# Patient Record
Sex: FEMALE | Race: WHITE | NOT HISPANIC OR LATINO | Employment: UNEMPLOYED | URBAN - METROPOLITAN AREA
[De-identification: names, ages, dates, MRNs, and addresses within clinical notes are randomized per-mention and may not be internally consistent; named-entity substitution may affect disease eponyms.]

---

## 2021-10-16 RX ORDER — SPIRONOLACTONE 100 MG/1
TABLET, FILM COATED ORAL
COMMUNITY
End: 2021-10-20

## 2021-10-16 RX ORDER — LEVOTHYROXINE SODIUM 100 UG/1
TABLET ORAL
COMMUNITY
End: 2021-10-20

## 2021-10-16 RX ORDER — LEVOTHYROXINE SODIUM 112 UG/1
TABLET ORAL
COMMUNITY
End: 2021-10-20

## 2021-10-16 RX ORDER — CLOBETASOL PROPIONATE 0.5 MG/G
CREAM TOPICAL
COMMUNITY
End: 2022-11-02 | Stop reason: SDUPTHER

## 2021-10-16 RX ORDER — BUPROPION HYDROCHLORIDE 300 MG/1
TABLET ORAL
COMMUNITY
End: 2021-10-20

## 2021-12-15 ENCOUNTER — OFFICE VISIT (OUTPATIENT)
Dept: GASTROENTEROLOGY | Facility: CLINIC | Age: 46
End: 2021-12-15
Payer: COMMERCIAL

## 2021-12-15 VITALS
WEIGHT: 177 LBS | HEIGHT: 66 IN | SYSTOLIC BLOOD PRESSURE: 118 MMHG | BODY MASS INDEX: 28.45 KG/M2 | HEART RATE: 91 BPM | DIASTOLIC BLOOD PRESSURE: 78 MMHG

## 2021-12-15 DIAGNOSIS — K62.5 ANAL BLEEDING: ICD-10-CM

## 2021-12-15 DIAGNOSIS — R14.0 BLOATING: ICD-10-CM

## 2021-12-15 DIAGNOSIS — K58.2 IRRITABLE BOWEL SYNDROME WITH BOTH CONSTIPATION AND DIARRHEA: Primary | ICD-10-CM

## 2021-12-15 PROCEDURE — 99203 PR OFFICE/OUTPT VISIT, NEW, LEVL III, 30-44 MIN: ICD-10-PCS | Mod: S$GLB,,, | Performed by: INTERNAL MEDICINE

## 2021-12-15 PROCEDURE — 99999 PR PBB SHADOW E&M-EST. PATIENT-LVL V: CPT | Mod: PBBFAC,,, | Performed by: INTERNAL MEDICINE

## 2021-12-15 PROCEDURE — 1160F PR REVIEW ALL MEDS BY PRESCRIBER/CLIN PHARMACIST DOCUMENTED: ICD-10-PCS | Mod: CPTII,S$GLB,, | Performed by: INTERNAL MEDICINE

## 2021-12-15 PROCEDURE — 3074F PR MOST RECENT SYSTOLIC BLOOD PRESSURE < 130 MM HG: ICD-10-PCS | Mod: CPTII,S$GLB,, | Performed by: INTERNAL MEDICINE

## 2021-12-15 PROCEDURE — 1159F PR MEDICATION LIST DOCUMENTED IN MEDICAL RECORD: ICD-10-PCS | Mod: CPTII,S$GLB,, | Performed by: INTERNAL MEDICINE

## 2021-12-15 PROCEDURE — 3008F PR BODY MASS INDEX (BMI) DOCUMENTED: ICD-10-PCS | Mod: CPTII,S$GLB,, | Performed by: INTERNAL MEDICINE

## 2021-12-15 PROCEDURE — 1160F RVW MEDS BY RX/DR IN RCRD: CPT | Mod: CPTII,S$GLB,, | Performed by: INTERNAL MEDICINE

## 2021-12-15 PROCEDURE — 99203 OFFICE O/P NEW LOW 30 MIN: CPT | Mod: S$GLB,,, | Performed by: INTERNAL MEDICINE

## 2021-12-15 PROCEDURE — 3008F BODY MASS INDEX DOCD: CPT | Mod: CPTII,S$GLB,, | Performed by: INTERNAL MEDICINE

## 2021-12-15 PROCEDURE — 3078F DIAST BP <80 MM HG: CPT | Mod: CPTII,S$GLB,, | Performed by: INTERNAL MEDICINE

## 2021-12-15 PROCEDURE — 1159F MED LIST DOCD IN RCRD: CPT | Mod: CPTII,S$GLB,, | Performed by: INTERNAL MEDICINE

## 2021-12-15 PROCEDURE — 3074F SYST BP LT 130 MM HG: CPT | Mod: CPTII,S$GLB,, | Performed by: INTERNAL MEDICINE

## 2021-12-15 PROCEDURE — 3078F PR MOST RECENT DIASTOLIC BLOOD PRESSURE < 80 MM HG: ICD-10-PCS | Mod: CPTII,S$GLB,, | Performed by: INTERNAL MEDICINE

## 2021-12-15 PROCEDURE — 99999 PR PBB SHADOW E&M-EST. PATIENT-LVL V: ICD-10-PCS | Mod: PBBFAC,,, | Performed by: INTERNAL MEDICINE

## 2021-12-15 RX ORDER — SODIUM, POTASSIUM,MAG SULFATES 17.5-3.13G
1 SOLUTION, RECONSTITUTED, ORAL ORAL DAILY
Qty: 1 KIT | Refills: 0 | Status: SHIPPED | OUTPATIENT
Start: 2021-12-15 | End: 2021-12-17

## 2021-12-16 ENCOUNTER — PATIENT MESSAGE (OUTPATIENT)
Dept: ENDOSCOPY | Facility: HOSPITAL | Age: 46
End: 2021-12-16
Payer: COMMERCIAL

## 2022-01-27 ENCOUNTER — TELEPHONE (OUTPATIENT)
Dept: GASTROENTEROLOGY | Facility: CLINIC | Age: 47
End: 2022-01-27
Payer: COMMERCIAL

## 2022-01-27 NOTE — TELEPHONE ENCOUNTER
I called the pt. back and all the instructions regarding her procedure on 02/02/22 was given to her over [employer] verbalized understanding.

## 2022-01-28 ENCOUNTER — PATIENT MESSAGE (OUTPATIENT)
Dept: PREADMISSION TESTING | Facility: HOSPITAL | Age: 47
End: 2022-01-28
Payer: COMMERCIAL

## 2022-02-01 RX ORDER — SODIUM CHLORIDE, SODIUM LACTATE, POTASSIUM CHLORIDE, CALCIUM CHLORIDE 600; 310; 30; 20 MG/100ML; MG/100ML; MG/100ML; MG/100ML
INJECTION, SOLUTION INTRAVENOUS CONTINUOUS
Status: CANCELLED | OUTPATIENT
Start: 2022-02-01

## 2022-02-01 RX ORDER — SODIUM CHLORIDE 0.9 % (FLUSH) 0.9 %
10 SYRINGE (ML) INJECTION
Status: CANCELLED | OUTPATIENT
Start: 2022-02-01

## 2022-02-02 ENCOUNTER — ANESTHESIA (OUTPATIENT)
Dept: ENDOSCOPY | Facility: HOSPITAL | Age: 47
End: 2022-02-02
Payer: COMMERCIAL

## 2022-02-02 ENCOUNTER — ANESTHESIA EVENT (OUTPATIENT)
Dept: ENDOSCOPY | Facility: HOSPITAL | Age: 47
End: 2022-02-02
Payer: COMMERCIAL

## 2022-02-02 ENCOUNTER — HOSPITAL ENCOUNTER (OUTPATIENT)
Facility: HOSPITAL | Age: 47
Discharge: HOME | End: 2022-02-02
Attending: INTERNAL MEDICINE | Admitting: INTERNAL MEDICINE
Payer: COMMERCIAL

## 2022-02-02 DIAGNOSIS — K62.5 ANAL BLEEDING: ICD-10-CM

## 2022-02-02 DIAGNOSIS — D12.2 ADENOMATOUS POLYP OF ASCENDING COLON: ICD-10-CM

## 2022-02-02 DIAGNOSIS — R19.7 DIARRHEA IN ADULT PATIENT: Primary | ICD-10-CM

## 2022-02-02 DIAGNOSIS — R19.7 DIARRHEA: ICD-10-CM

## 2022-02-02 LAB
B-HCG UR QL: NEGATIVE
CTP QC/QA: NORMAL
CTP QC/QA: NORMAL
SARS-COV-2 RDRP RESP QL NAA+PROBE: NEGATIVE

## 2022-02-02 PROCEDURE — 88305 TISSUE EXAM BY PATHOLOGIST: ICD-10-PCS | Mod: 26,,, | Performed by: STUDENT IN AN ORGANIZED HEALTH CARE EDUCATION/TRAINING PROGRAM

## 2022-02-02 PROCEDURE — 45385 COLONOSCOPY W/LESION REMOVAL: CPT | Performed by: INTERNAL MEDICINE

## 2022-02-02 PROCEDURE — 37000009 HC ANESTHESIA EA ADD 15 MINS: Performed by: INTERNAL MEDICINE

## 2022-02-02 PROCEDURE — 25000003 PHARM REV CODE 250: Performed by: STUDENT IN AN ORGANIZED HEALTH CARE EDUCATION/TRAINING PROGRAM

## 2022-02-02 PROCEDURE — 81025 URINE PREGNANCY TEST: CPT | Performed by: INTERNAL MEDICINE

## 2022-02-02 PROCEDURE — 27201089 HC SNARE, DISP (ANY): Performed by: INTERNAL MEDICINE

## 2022-02-02 PROCEDURE — 63600175 PHARM REV CODE 636 W HCPCS: Performed by: STUDENT IN AN ORGANIZED HEALTH CARE EDUCATION/TRAINING PROGRAM

## 2022-02-02 PROCEDURE — 88305 TISSUE EXAM BY PATHOLOGIST: CPT | Mod: 26,,, | Performed by: STUDENT IN AN ORGANIZED HEALTH CARE EDUCATION/TRAINING PROGRAM

## 2022-02-02 PROCEDURE — 88305 TISSUE EXAM BY PATHOLOGIST: CPT | Mod: 59 | Performed by: STUDENT IN AN ORGANIZED HEALTH CARE EDUCATION/TRAINING PROGRAM

## 2022-02-02 PROCEDURE — U0002 COVID-19 LAB TEST NON-CDC: HCPCS | Performed by: INTERNAL MEDICINE

## 2022-02-02 PROCEDURE — 37000008 HC ANESTHESIA 1ST 15 MINUTES: Performed by: INTERNAL MEDICINE

## 2022-02-02 PROCEDURE — 45385 COLONOSCOPY W/LESION REMOVAL: CPT | Mod: ,,, | Performed by: INTERNAL MEDICINE

## 2022-02-02 PROCEDURE — 45385 PR COLONOSCOPY,REMV LESN,SNARE: ICD-10-PCS | Mod: ,,, | Performed by: INTERNAL MEDICINE

## 2022-02-02 RX ORDER — SODIUM CHLORIDE, SODIUM LACTATE, POTASSIUM CHLORIDE, CALCIUM CHLORIDE 600; 310; 30; 20 MG/100ML; MG/100ML; MG/100ML; MG/100ML
INJECTION, SOLUTION INTRAVENOUS CONTINUOUS PRN
Status: DISCONTINUED | OUTPATIENT
Start: 2022-02-02 | End: 2022-02-02

## 2022-02-02 RX ORDER — LIDOCAINE HYDROCHLORIDE 10 MG/ML
INJECTION, SOLUTION EPIDURAL; INFILTRATION; INTRACAUDAL; PERINEURAL
Status: DISCONTINUED | OUTPATIENT
Start: 2022-02-02 | End: 2022-02-02

## 2022-02-02 RX ORDER — PROPOFOL 10 MG/ML
VIAL (ML) INTRAVENOUS
Status: DISCONTINUED | OUTPATIENT
Start: 2022-02-02 | End: 2022-02-02

## 2022-02-02 RX ORDER — SODIUM CHLORIDE, SODIUM LACTATE, POTASSIUM CHLORIDE, CALCIUM CHLORIDE 600; 310; 30; 20 MG/100ML; MG/100ML; MG/100ML; MG/100ML
INJECTION, SOLUTION INTRAVENOUS CONTINUOUS
Status: DISCONTINUED | OUTPATIENT
Start: 2022-02-02 | End: 2022-02-02 | Stop reason: HOSPADM

## 2022-02-02 RX ADMIN — PROPOFOL 50 MG: 10 INJECTION, EMULSION INTRAVENOUS at 10:02

## 2022-02-02 RX ADMIN — PROPOFOL 100 MG: 10 INJECTION, EMULSION INTRAVENOUS at 10:02

## 2022-02-02 RX ADMIN — SODIUM CHLORIDE, SODIUM LACTATE, POTASSIUM CHLORIDE, AND CALCIUM CHLORIDE: 600; 310; 30; 20 INJECTION, SOLUTION INTRAVENOUS at 09:02

## 2022-02-02 RX ADMIN — LIDOCAINE HYDROCHLORIDE 100 MG: 10 INJECTION, SOLUTION EPIDURAL; INFILTRATION; INTRACAUDAL; PERINEURAL at 10:02

## 2022-02-02 NOTE — ANESTHESIA PREPROCEDURE EVALUATION
02/02/2022  Arlyn Vaughan is a 46 y.o., female.    Anesthesia Evaluation    I have reviewed the Patient Summary Reports.    I have reviewed the Nursing Notes. I have reviewed the NPO Status.   I have reviewed the Medications.     Review of Systems  Endocrine:   Hypothyroidism    Psych:   Psychiatric History          Physical Exam  General:  Well nourished    Airway/Jaw/Neck:  Airway Findings: Mouth Opening: Normal Tongue: Normal  General Airway Assessment: Adult  TM Distance: Normal, at least 6 cm  Jaw/Neck Findings:  Neck ROM: Normal ROM     Eyes/Ears/Nose:  Eyes/Ears/Nose Findings:    Dental:  Dental Findings: In tact        Mental Status:  Mental Status Findings:  Cooperative, Alert and Oriented         Anesthesia Plan  Type of Anesthesia, risks & benefits discussed:  Anesthesia Type:  MAC    Patient's Preference:   Plan Factors:          Intra-op Monitoring Plan: standard ASA monitors  Intra-op Monitoring Plan Comments:   Post Op Pain Control Plan: per primary service following discharge from PACU  Post Op Pain Control Plan Comments:     Induction:   IV  Beta Blocker:  Patient is not currently on a Beta-Blocker (No further documentation required).       Informed Consent: Patient understands risks and agrees with Anesthesia plan.  Questions answered. Anesthesia consent signed with patient.  ASA Score: 2     Day of Surgery Review of History & Physical: I have interviewed and examined the patient. I have reviewed the patient's H&P dated:            Ready For Surgery From Anesthesia Perspective.

## 2022-02-02 NOTE — H&P
Short Stay Endoscopy History and Physical    PCP - Rosie Rao MD    Procedure - Colonoscopy  ASA - 2  Mallampati - per anesthesia  History of Anesthesia problems - no  Family history Anesthesia problems -  no     HPI:  This is a 46 y.o.female here for evaluation of : Anal bleeding; Diarrhea    Reflux - no  Dysphagia - no  Abdominal pain - no  Diarrhea - yes  Anemia - no  GI bleeding - yes  Nausea and vomiting-no  Early satiety-no  aversion to sight or smell of food-no    ROS:  Constitutional: No fevers, chills, No weight loss  ENT: No allergies  CV: No chest pain  Pulm: No cough, No shortness of breath  Ophtho: No vision changes  GI: see HPI  Derm: No rash  Heme: No lymphadenopathy, No bruising  MSK: No arthritis  : No dysuria, No hematuria  Endo: No hot or cold intolerance  Neuro: No syncope, No seizure  Psych: No anxiety, No depression    Medical History:  Past Medical History:   Diagnosis Date    Breast cyst     Herpes     Hypothyroidism     Lichen sclerosus        Surgical History:  Past Surgical History:   Procedure Laterality Date    EXPLORATORY LAPAROTOMY      SALPINGOOPHORECTOMY Left        Family History:  Family History   Problem Relation Age of Onset    Heart disease Mother     Gallbladder disease Mother     Fibromyalgia Mother     Arrhythmia Mother     Diabetes type II Father     Gallbladder disease Sister        Social History:  Social History     Socioeconomic History    Marital status:    Tobacco Use    Smoking status: Never Smoker    Smokeless tobacco: Never Used   Substance and Sexual Activity    Alcohol use: Never    Drug use: Never    Sexual activity: Yes     Partners: Male     Birth control/protection: I.U.D.       Allergies: Review of patient's allergies indicates:  No Known Allergies    Medications:   No current facility-administered medications on file prior to encounter.     Current Outpatient Medications on File Prior to Encounter   Medication Sig Dispense  Refill    ACYCLOVIR ORAL       ascorbic acid, vitamin C, (VITAMIN C) 1000 MG tablet Take 1,000 mg by mouth.      biotin 10,000 mcg Cap Take by mouth.      buPROPion (WELLBUTRIN XL) 300 MG 24 hr tablet Take 150 mg by mouth every morning.      calcium carbonate/vitamin D3 (VITAMIN D-3 ORAL)       cetirizine (ZYRTEC) 10 MG tablet Take 10 mg by mouth once daily.      clobetasoL (TEMOVATE) 0.05 % cream       DULoxetine (CYMBALTA) 30 MG capsule Take 1 capsule by mouth every morning.      ferrous sulfate (FEOSOL) 325 mg (65 mg iron) Tab tablet Take 325 mg by mouth.      hydrocortisone 2.5 % cream Apply topically.      levonorgestreL (MIRENA) 20 mcg/24 hours (7 yrs) 52 mg IUD 1 each by Intrauterine route once.      levothyroxine (SYNTHROID, LEVOTHROID) 175 MCG tablet Take 1 tablet by mouth every morning.      multivitamin (THERAGRAN) per tablet Take 1 tablet by mouth once daily.      spironolactone (ALDACTONE) 100 MG tablet Take 1 tablet by mouth once daily.      VITAMIN B COMPLEX ORAL Take 1 tablet by mouth once daily.      ZINC ORAL Take 100 mg by mouth.         Objective Findings:    Vital Signs:There were no vitals filed for this visit.        Physical Exam:  General Appearance: Well appearing in no acute distress  Eyes:    No scleral icterus  ENT: Neck supple, Lips, mucosa, and tongue normal; teeth and gums normal  Lungs: CTA bilaterally in anterior and posterior fields, no wheezes, no crackles.  Heart:  Regular rate, S1, S2 normal, no murmurs heard.  Abdomen: Soft, non tender, non distended with normal bowel sounds. No hepatosplenomegaly, ascites, or mass.  Extremities: No clubbing, cyanosis or edema  Skin: No rash    Labs:  Reviewed    Plan: Colonoscopy  I have explained the risks and benefits of endoscopy procedures to the patient including but not limited to bleeding, perforation, infection, and death. The patient wishes to proceed.

## 2022-02-02 NOTE — INTERVAL H&P NOTE
The patient has been examined and the H&P has been reviewed:I have reviewed this note and I agree with this assessment. The patient was seen in the GI office and remains stable for endoscopy at the time of this present evaluation. GH     risks, benefits and alternative options discussed and understood by patient/family.          There are no hospital problems to display for this patient.

## 2022-02-02 NOTE — [employer] PATIENT INSTRUCTIONS
Discharge Summary/Instructions after an Endoscopic Procedure  Patient Name: Arlyn Vaughan  Patient MRN: [id number]  Patient YOB: 1975 Wednesday, February 2, 2022 Fransico Miles III, MD  Dear patient,  As a result of recent federal legislation (The Federal Cures Act), you may   receive lab or pathology results from your procedure in your [facility]   account before your physician is able to contact you. Your physician or   their representative will relay the results to you with their   recommendations at their soonest availability.  Thank you,  RESTRICTIONS:  During your procedure today, you received medications for sedation.  These   medications may affect your judgment, balance and coordination.  Therefore,   for 24 hours, you have the following restrictions:   - DO NOT drive a car, operate machinery, make legal/financial decisions,   sign important papers or drink alcohol.    ACTIVITY:  Today: no heavy lifting, straining or running due to procedural   sedation/anesthesia.  The following day: return to full activity including work.  DIET:  Eat and drink normally unless instructed otherwise.     TREATMENT FOR COMMON SIDE EFFECTS:  - Mild abdominal pain, nausea, belching, bloating or excessive gas:  rest,   eat lightly and use a heating pad.  - Sore Throat: treat with throat lozenges and/or gargle with warm salt   water.  - Because air was used during the procedure, expelling large amounts of air   from your rectum or belching is normal.  - If a bowel prep was taken, you may not have a bowel movement for 1-3 days.    This is normal.  SYMPTOMS TO WATCH FOR AND REPORT TO YOUR PHYSICIAN:  1. Abdominal pain or bloating, other than gas cramps.  2. Chest pain.  3. Back pain.  4. Signs of infection such as: chills or fever occurring within 24 hours   after the procedure.  5. Rectal bleeding, which would show as bright red, maroon, or black stools.   (A tablespoon of blood from the rectum is not serious, especially  if   hemorrhoids are present.)  6. Vomiting.  7. Weakness or dizziness.  GO DIRECTLY TO THE NEAREST EMERGENCY ROOM IF YOU HAVE ANY OF THE FOLLOWING:      Difficulty breathing              Chills and/or fever over 101 F   Persistent vomiting and/or vomiting blood   Severe abdominal pain   Severe chest pain   Black, tarry stools   Bleeding- more than one tablespoon   Any other symptom or condition that you feel may need urgent attention  Your doctor recommends these additional instructions:  If any biopsies were taken, your doctors clinic will contact you in 1 to 2   weeks with any results.  - Discharge patient to home (via wheelchair).   - High fiber diet.   - Continue present medications.   - Await pathology results.   - Repeat colonoscopy in 3 years for surveillance.   - Return to primary care physician as previously scheduled.   - Discharge patient to home (via wheelchair).   - High fiber diet.   - Continue present medications.   - Await pathology results.   - Repeat colonoscopy in 3 years for surveillance.   - Return to primary care physician as previously scheduled.  For questions, problems or results please call your physician Fransico Miles III, MD at Work:  [phone]  If you have any questions about the above instructions, call the GI   department at [phone] or call the endoscopy unit at [phone]   from 7am until 3 pm.  [facility] [place], EMERGENCY ROOM PHONE NUMBER:   [phone]  IF A COMPLICATION OR EMERGENCY SITUATION ARISES AND YOU ARE UNABLE TO REACH   YOUR PHYSICIAN - GO DIRECTLY TO THE EMERGENCY ROOM.  I have read or have had read to me these discharge instructions for my   procedure and have received a written copy.  I understand these   instructions and will follow-up with my physician if I have any questions.     __________________________________       _____________________________________  Nurse Signature                                           Patient/Designated   Responsible Party Signature  Farnsico Miles III, MD  2/2/2022 10:50:26 AM  This report has been verified and signed electronically.  Dear patient,  As a result of recent federal legislation (The Federal Cures Act), you may   receive lab or pathology results from your procedure in your [facility]   account before your physician is able to contact you. Your physician or   their representative will relay the results to you with their   recommendations at their soonest availability.  Thank you,  [employer]

## 2022-02-02 NOTE — DISCHARGE SUMMARY
[facility] - Endoscopy (Hospital)  Discharge Note  Short Stay    Procedure(s) (LRB):  COLONOSCOPY (N/A)    OUTCOME: Patient tolerated treatment/procedure well without complication and is now ready for discharge.    DISPOSITION: Home or Self Care    FINAL DIAGNOSIS: Colon Polyps    FOLLOWUP: In clinic    DISCHARGE INSTRUCTIONS:    Discharge Procedure Orders   Diet general     Activity as tolerated         Clinical Reference Documents Added to Patient Instructions       Document    COLON POLYPECTOMY DISCHARGE INSTRUCTIONS (ENGLISH)    COLONOSCOPY DISCHARGE INSTRUCTIONS (ENGLISH)          TIME SPENT ON DISCHARGE: 20 minutes

## 2022-02-02 NOTE — DISCHARGE INSTRUCTIONS
Patient Education       High Fiber Diet   About this topic   Dietary fiber helps many illnesses. It can help you if you cannot have a bowel movement or if you have loose stools. Fiber can also lower your risk of diabetes and heart disease. Fiber can help with weight loss by helping you feel gardner after meals.  You can find fiber in fruits, vegetables, nuts and seeds, whole grains, and legumes. The fiber is the part of the plant food that your body cannot break down and absorb. It passes through your stomach, small bowel, colon, and out your body.  There are two kinds of fiber: Insoluble and soluble fiber. Insoluble fiber helps you pass foods through your digestive system. Insoluble fiber can help you with hard stools. Soluble fiber draws water in and turns it into a gel-like form making digestion slow down. Both are important.       What will the results be?   A high fiber diet can help you with bowel problems like stools that are too hard or too loose. It can also help prevent hemorrhoids and other colon problems. A high fiber diet can also help control your weight and lower blood sugar and cholesterol levels.  What changes to diet are needed?   · The amount of fiber you need is based on your age, gender, and health.  · Try to get 20 to 35 grams of fiber in your diet each day. Most people in the [place] only eat 15 grams of fiber daily.  · Drink at least 8 cups (1920 mL) of fluid each day.  When is this diet used?   Your doctor may talk with you about this diet if you have belly problems.  Who should use this diet?   Older children, young people, and adults can have this diet.   Who should not use this diet?   Some people should not use this diet. Check with your doctor if you have:  · Diverticulitis  · Active Crohn's disease  · Ulcerative colitis  · Bowel inflammation  · Certain types of GI surgery  Talk to your child's doctor before starting your child on a high fiber diet.  What foods are good to eat?   To get the  most from fiber in your diet, eat a wide variety of high fiber foods. Some examples are:  · Vegetables like:  ? Spinach  ? Peas  ? Artichoke  ? Sweet potatoes with skin  ? Broccoli  · Fruits like:  ? Raspberries  ? Blueberries  ? Blackberries  ? Apples with skin  ? Dried fruits  · Grains like:  ? Oat bran  ? Barley  ? Whole wheat products  ? Wheat bran  · Dried beans and nuts like:  ? Sunflower seeds  ? Almonds  ? Black beans  ? Chickpeas  What problems could happen?   · Sudden increase of fiber intake can lead to gas, pain, fullness in your belly, and loose stools. Increase fiber gradually while drinking plenty of fluids.  · Do not eat too much fiber. Your body will not take in vitamins and minerals as well if you eat too much fiber.  When do I need to call the doctor?   Health problem is not better or you are feeling worse.  Helpful tips   · Start slow as you add more fiber to your diet. This may help prevent gas or cramps.  · Try to eat the same amount of fiber each day. Aim to get your fiber from nutritious foods. Supplements do not offer the same benefits as food.  · Read food labels with care to learn how much fiber is in the food you are eating.  · If possible, do not peel fruits or vegetables before you eat them. Eating the peel gives you more fiber.  Where can I learn more?   Eat Right  https://www.eatright.org/food/vitamins-and-supplements/types-of-vitamins-and-nutrients/easy-ways-to-boost-fiber-in-your-daily-diet   Last Reviewed Date   2021-09-23  Consumer Information Use and Disclaimer   This information is not specific medical advice and does not replace information you receive from your health care provider. This is only a brief summary of general information. It does NOT include all information about conditions, illnesses, injuries, tests, procedures, treatments, therapies, discharge instructions or life-style choices that may apply to you. You must talk with your health care provider for complete  information about your health and treatment options. This information should not be used to decide whether or not to accept your health care providers advice, instructions or recommendations. Only your health care provider has the knowledge and training to provide advice that is right for you.  Copyright   Copyright © 2021 [employer], [employer] and its affiliates and/or licensors. All rights reserved.  Patient Education       Colonoscopy Discharge Instructions   About this topic   Colonoscopy is done so your doctor can see the inside of your large intestines, also called your colon, and your rectum. It uses a lighted tube called a scope, which has a tiny camera that can be moved through the large intestine. This may be done to:  · Screen for colon cancer or polyps  · Look for the source of rectal bleeding  · Find the cause of changes in your bowel movement  · Find the cause of belly or rectal pain  · Check results from other tests  · Check your response to treatment for other diseases     What care is needed at home?   · Ask your doctor what you need to do when you go home. Make sure you ask questions if you do not understand what the doctor says. This way you will know what you need to do.  · Rest. Do not drive the rest of the day. Do not sign any important papers or make any important decisions for the next 24 hours.  · You may feel groggy. Take extra care when moving about.  · You may have gas or mild cramping. This is normal.  · A small amount of bleeding may happen during the first few days after your procedure.  · Start back on your normal diet unless you need some changes in your diet.  What follow-up care is needed?   · Your doctor will talk to you about your test results. Your doctor may ask you to make visits to the office to check on your progress. Be sure to keep these visits.  · Ask your doctor when you need to have another colonoscopy. The amount of time between tests is based on what was found during this  test. People with no polyps may be able to wait 10 years to have another colonoscopy. Other people may need to have this test repeated in 1 year because of the kind of polyps that were found in their colon. Ask your doctor when you need to come back.  What drugs may be needed?   Ask your doctor what drugs you will need to take. Take your drugs as told by your doctor.  Will physical activity be limited?   Physical activities may be limited for a short time. Rest after the procedure. You may go back to your normal activities within a day or so.  What changes to diet are needed?   · Drink 6 to 8 glasses of water each day.  · Eat food rich in fiber like fresh fruits and vegetables.  What problems could happen?   · Tear inside your colon  · Bleeding can happen up to a few days afterwards  When do I need to call the doctor?   · Fever of 100.4°F (38°C) or higher, chills  · Bleeding during bowel movements (1 teaspoon (5 mL) or more) or maroon stool  · Throwing up more than 3 times in the next 48 hours  · Feeling dizzy  · Feeling weak  · Belly pain that is getting worse  · Not able to have a bowel movement for more than 2 days  · Hard swollen belly  Teach Back: Helping You Understand   The Teach Back Method helps you understand the information we are giving you. After you talk with the staff, tell them in your own words what you learned. This helps to make sure the staff has described each thing clearly. It also helps to explain things that may have been confusing. Before going home, make sure you can do these:  · I can tell you about my procedure.  · I can tell you what signs are normal after my procedure.  · I can tell you what I will do if I throw up more than 3 times in the next 48 hours or I have more belly pain.  Where can I learn more?   American Cancer Society  https://www.cancer.org/cancer/colon-rectal-cancer/pcuyivnga-scjnfzcrq-thtijxc/maaadsbzw-ewsjf-oedc.html   American Society of Clinical  Oncology  https://www.cancer.net/navigating-cancer-care/diagnosing-cancer/tests-and-procedures/colonoscopy   Last Reviewed Date   2021-03-10  Consumer Information Use and Disclaimer   This information is not specific medical advice and does not replace information you receive from your health care provider. This is only a brief summary of general information. It does NOT include all information about conditions, illnesses, injuries, tests, procedures, treatments, therapies, discharge instructions or life-style choices that may apply to you. You must talk with your health care provider for complete information about your health and treatment options. This information should not be used to decide whether or not to accept your health care providers advice, instructions or recommendations. Only your health care provider has the knowledge and training to provide advice that is right for you.  Copyright   Copyright © 2021 UpToDate, Inc. and its affiliates and/or licensors. All rights reserved.  Patient Education       Colon Polypectomy Discharge Instructions   About this topic   The colon is also called the large intestine. It is a long, hollow tube at the end of your digestive tract. It absorbs water from solid waste and changes it from liquid to a solid bowel movement.  A colon polyp is a growth of extra tissue that is not normally in your colon. Colon polyps do not often cause any signs. Most colon polyps are not cancer, but some polyps may turn into cancer. The doctor takes the polyps out during a procedure called a colonoscopy and sends them to the lab for a check to make sure there is no cancer.  You may have a colon polyp or multiple polyps removed. The doctor will send them to the lab to see what type they are. If a polyp is very large, it may need to be removed by surgery.     What care is needed at home?   · Ask your doctor what you need to do when you go home. Make sure you ask questions if you do not  understand what the doctor says.  · Do not drive for 24 hours after a colonoscopy.  · Take your drugs as ordered by your doctor.  · Go back to your normal diet unless your doctor has told you to make some changes in your diet.  · Rest  What follow-up care is needed?   · Your doctor may ask you to make visits to the office to check on your progress. Be sure to keep these visits.  · Your doctor may suggest you get tested regularly. People with colon polyps need to have a colonoscopy regularly to check for the growth of new polyps.  · Some polyps may not be removed and more surgery may be needed.  · The results of the polyp testing will be given to you at one of these visits.  What drugs may be needed?   The doctor may order drugs to:  · Prevent hard stools  · Help with pain  · Reduce your risk of colon polyps or colon cancer  Will physical activity be limited?   You may feel sleepy after the colonoscopy. Try to get some rest.  What can be done to prevent this health problem?   · Have regular colonoscopies.  · Eat foods high in fiber.  · Eat foods low in fat.  · Limit your intake of beer, wine, and mixed drinks (alcohol).  · Ask your doctor or dietitian for a diet that is right for you. Include calcium in your diet. Good sources of calcium include milk, cheese, and yogurt.  When do I need to call the doctor?   · Signs of infection. These include a fever of 100.4°F (38°C) or higher, chills, and anal itching or pain.  · Bleeding from rectum that gets worse  · Belly becomes swollen and sore  · Upset stomach and throwing up continues after you return home  · Not being able to move your bowels  · Weight loss without trying  · Blood in your stool  Teach Back: Helping You Understand   The Teach Back Method helps you understand the information we are giving you. After you talk with the staff, tell them in your own words what you learned. This helps to make sure the staff has described each thing clearly. It also helps to  explain things that may have been confusing. Before going home, make sure you can do these:  · I can tell you about my condition.  · I can tell you what changes I need to make with my diet.  · I can tell you what I will do if my stomach is swollen, I have belly pain, or there is blood in my stool.  Where can I learn more?   [employer]  https://www.betterhealth.[place].gov.[place]/health/ConditionsAndTreatments/colonoscopy   [facility]HS  https://www.nhs.uk/conditions/bowel-polyps/   UpToDate  https://www.[employer]/contents/colon-polyps-beyond-the-basics   Last Reviewed Date   2021-03-10  Consumer Information Use and Disclaimer   This information is not specific medical advice and does not replace information you receive from your health care provider. This is only a brief summary of general information. It does NOT include all information about conditions, illnesses, injuries, tests, procedures, treatments, therapies, discharge instructions or life-style choices that may apply to you. You must talk with your health care provider for complete information about your health and treatment options. This information should not be used to decide whether or not to accept your health care providers advice, instructions or recommendations. Only your health care provider has the knowledge and training to provide advice that is right for you.  Copyright   Copyright © 2021 UpToDate, Inc. and its affiliates and/or licensors. All rights reserved.

## 2022-02-02 NOTE — PLAN OF CARE
Time out done, agreed by staff, and patient adequately sedated.  See anesthesia notes for vitals signs and medications.

## 2022-02-02 NOTE — ANESTHESIA POSTPROCEDURE EVALUATION
Anesthesia Post Evaluation    Patient: Arlyn Vaughan    Procedure(s) Performed: Procedure(s) (LRB):  COLONOSCOPY (N/A)    Final Anesthesia Type: MAC      Patient location during evaluation: GI PACU  Patient participation: Yes- Able to Participate  Level of consciousness: awake and alert and oriented  Post-procedure vital signs: reviewed and stable  Pain management: adequate  Airway patency: patent  ERICA mitigation strategies: Multimodal analgesia  PONV status at discharge: No PONV  Anesthetic complications: no      Cardiovascular status: blood pressure returned to baseline  Respiratory status: unassisted  Hydration status: euvolemic  Follow-up not needed.          Vitals Value Taken Time   /67 02/02/22 0926   Temp 36.6 °C (97.9 °F) 02/02/22 0906   Pulse 85 02/02/22 0926   Resp 19 02/02/22 0926   SpO2 99 % 02/02/22 0926         No case tracking events are documented in the log.      Pain/Romero Score: No data recorded

## 2022-02-03 VITALS
WEIGHT: 175 LBS | HEIGHT: 66 IN | TEMPERATURE: 97 F | SYSTOLIC BLOOD PRESSURE: 123 MMHG | OXYGEN SATURATION: 98 % | DIASTOLIC BLOOD PRESSURE: 58 MMHG | BODY MASS INDEX: 28.12 KG/M2 | RESPIRATION RATE: 20 BRPM | HEART RATE: 66 BPM

## 2022-02-08 LAB
FINAL PATHOLOGIC DIAGNOSIS: NORMAL
GROSS: NORMAL
Lab: NORMAL

## 2023-11-20 ENCOUNTER — OFFICE VISIT (OUTPATIENT)
Dept: UROLOGY | Facility: CLINIC | Age: 48
End: 2023-11-20
Payer: COMMERCIAL

## 2023-11-20 VITALS — RESPIRATION RATE: 16 BRPM | WEIGHT: 174.19 LBS | HEIGHT: 66 IN | BODY MASS INDEX: 27.99 KG/M2

## 2023-11-20 DIAGNOSIS — N39.3 STRESS INCONTINENCE IN FEMALE: Primary | ICD-10-CM

## 2023-11-20 DIAGNOSIS — N39.46 MIXED INCONTINENCE: ICD-10-CM

## 2023-11-20 DIAGNOSIS — N81.10 BADEN-WALKER GRADE 2 CYSTOCELE: ICD-10-CM

## 2023-11-20 LAB
BILIRUB UR QL STRIP: NEGATIVE
GLUCOSE UR QL STRIP: NEGATIVE
KETONES UR QL STRIP: NEGATIVE
LEUKOCYTE ESTERASE UR QL STRIP: NEGATIVE
PH, POC UA: 6
POC BLOOD, URINE: NEGATIVE
POC NITRATES, URINE: NEGATIVE
POC RESIDUAL URINE VOLUME: 27 ML (ref 0–100)
PROT UR QL STRIP: NEGATIVE
SP GR UR STRIP: >+=1.03 (ref 1–1.03)
UROBILINOGEN UR STRIP-ACNC: 0.2 (ref 0.1–1.1)

## 2023-11-20 PROCEDURE — 3008F PR BODY MASS INDEX (BMI) DOCUMENTED: ICD-10-PCS | Mod: CPTII,S$GLB,, | Performed by: UROLOGY

## 2023-11-20 PROCEDURE — 81003 POCT URINALYSIS, DIPSTICK, AUTOMATED, W/O SCOPE: ICD-10-PCS | Mod: QW,S$GLB,, | Performed by: UROLOGY

## 2023-11-20 PROCEDURE — 81003 URINALYSIS AUTO W/O SCOPE: CPT | Mod: QW,S$GLB,, | Performed by: UROLOGY

## 2023-11-20 PROCEDURE — 99999 PR PBB SHADOW E&M-EST. PATIENT-LVL V: ICD-10-PCS | Mod: PBBFAC,,, | Performed by: UROLOGY

## 2023-11-20 PROCEDURE — 3044F PR MOST RECENT HEMOGLOBIN A1C LEVEL <7.0%: ICD-10-PCS | Mod: CPTII,S$GLB,, | Performed by: UROLOGY

## 2023-11-20 PROCEDURE — 99203 OFFICE O/P NEW LOW 30 MIN: CPT | Mod: S$GLB,,, | Performed by: UROLOGY

## 2023-11-20 PROCEDURE — 51798 POCT BLADDER SCAN: ICD-10-PCS | Mod: S$GLB,,, | Performed by: UROLOGY

## 2023-11-20 PROCEDURE — 51798 US URINE CAPACITY MEASURE: CPT | Mod: S$GLB,,, | Performed by: UROLOGY

## 2023-11-20 PROCEDURE — 3044F HG A1C LEVEL LT 7.0%: CPT | Mod: CPTII,S$GLB,, | Performed by: UROLOGY

## 2023-11-20 PROCEDURE — 99999 PR PBB SHADOW E&M-EST. PATIENT-LVL V: CPT | Mod: PBBFAC,,, | Performed by: UROLOGY

## 2023-11-20 PROCEDURE — 1159F PR MEDICATION LIST DOCUMENTED IN MEDICAL RECORD: ICD-10-PCS | Mod: CPTII,S$GLB,, | Performed by: UROLOGY

## 2023-11-20 PROCEDURE — 1160F PR REVIEW ALL MEDS BY PRESCRIBER/CLIN PHARMACIST DOCUMENTED: ICD-10-PCS | Mod: CPTII,S$GLB,, | Performed by: UROLOGY

## 2023-11-20 PROCEDURE — 99203 PR OFFICE/OUTPT VISIT, NEW, LEVL III, 30-44 MIN: ICD-10-PCS | Mod: S$GLB,,, | Performed by: UROLOGY

## 2023-11-20 PROCEDURE — 1159F MED LIST DOCD IN RCRD: CPT | Mod: CPTII,S$GLB,, | Performed by: UROLOGY

## 2023-11-20 PROCEDURE — 1160F RVW MEDS BY RX/DR IN RCRD: CPT | Mod: CPTII,S$GLB,, | Performed by: UROLOGY

## 2023-11-20 PROCEDURE — 3008F BODY MASS INDEX DOCD: CPT | Mod: CPTII,S$GLB,, | Performed by: UROLOGY

## 2023-11-20 NOTE — PROGRESS NOTES
Chief Complaint   Patient presents with    Urinary Frequency       Referring Provider: Dr. Carrie Lama*      History of Present Illness:   Arlyn Vaughan is a 48 y.o. female here for evaluation of Urinary Frequency    11/20/23-49yo female, here for evaluation of urinary incontinence. Pt reports that for the past couple of years, she ahs been having JOSE with sneeze, laugh. Progressively getting worse. Wears a liner, especially when she goes to the gym. She can even leak with jumping, lifting. She reports incontinence even with intercourse. In October, she had her first diagnosed UTI. Symptoms were painful and went on for a week, so she went to urgent care.   She saw Dr. Hunt, who prescribed vesicare, which didn't help with the incontinence, but caused an intermittent urinary stream. She has since stopped it.   She does report some urgency, which may occur as she gets close to the restroom. Sometimes she has UUI on the way.   She struggles with constipation. She was recently diagnosed with IBS. Taking probiotics and drinking Plexus.       Review of Systems   Constitutional:  Positive for fatigue.   Respiratory:  Negative for shortness of breath.    Cardiovascular:  Negative for chest pain.   Gastrointestinal:  Negative for abdominal pain.   Musculoskeletal:  Positive for arthralgias.   All other systems reviewed and are negative.        Past Medical History:   Diagnosis Date    Anemia     Breast cyst     Fever blister     Graves disease     Hypothyroidism     Lichen sclerosus     Urinary incontinence 2021    It is getting worse.       Past Surgical History:   Procedure Laterality Date    COLONOSCOPY N/A 02/02/2022    Procedure: COLONOSCOPY;  Surgeon: Fransico Miles III, MD;  Location: [facility];  Service: Endoscopy;  Laterality: N/A;    SALPINGOOPHORECTOMY Left 2008    SHOULDER SURGERY Right        Family History   Problem Relation Age of Onset    Diabetes type II Father     Migraines Father     Heart disease  Mother     Gallbladder disease Mother     Fibromyalgia Mother     Arrhythmia Mother     Breast cancer Mother     Osteoarthritis Mother     Rashes / Skin problems Mother     Thyroid disease Mother     Gallbladder disease Sister     Rashes / Skin problems Maternal Grandfather     Diabetes Paternal Grandmother        Social History     Tobacco Use    Smoking status: Never    Smokeless tobacco: Never   Substance Use Topics    Alcohol use: Never    Drug use: Never       Current Outpatient Medications   Medication Sig Dispense Refill    ascorbic acid, vitamin C, (VITAMIN C) 1000 MG tablet Take 1,000 mg by mouth.      biotin 10,000 mcg Cap Take by mouth.      busPIRone (BUSPAR) 5 MG Tab Take one tablet once a day      calcium carbonate/vitamin D3 (VITAMIN D-3 ORAL)       cetirizine (ZYRTEC) 10 MG tablet Take 10 mg by mouth once daily.      doxycycline (MONODOX) 50 MG Cap       ferrous sulfate (FEOSOL) 325 mg (65 mg iron) Tab tablet Take 325 mg by mouth.      hydrocortisone 2.5 % cream Apply topically.      levonorgestreL (MIRENA) 20 mcg/24 hours (7 yrs) 52 mg IUD 1 each by Intrauterine route once.      minoxidiL (LONITEN) 2.5 MG tablet       montelukast (SINGULAIR) 10 mg tablet Take 10 mg by mouth every evening.      multivitamin (THERAGRAN) per tablet Take 1 tablet by mouth once daily.      NP THYROID 120 mg Tab       NP THYROID 15 mg Tab       phentermine (ADIPEX-P) 37.5 mg tablet Take 37.5 mg by mouth before breakfast.      spironolactone (ALDACTONE) 100 MG tablet Take 1 tablet by mouth once daily.      VITAMIN B COMPLEX ORAL Take 1 tablet by mouth once daily.      ZINC ORAL Take 100 mg by mouth.      clobetasoL (TEMOVATE) 0.05 % cream Apply topically 2 (two) times daily. for 14 days 45 g 3     No current facility-administered medications for this visit.       Review of patient's allergies indicates:  No Known Allergies    Physical Exam  Vitals:    11/20/23 1025   Resp: 16     General: Well-developed, well-nourished, in  no acute distress  HEENT: Normocephalic, atraumatic, extraocular movements intact  Neck: Supple, no supraclavicular or cervical lymphadenopathy, trachea midline  Respirations: even and unlabored  Back: midline spine, No CVA tenderness  Abdomen: soft, Non-tender, non-distended, no palpable masses, no rebound or guarding  : Normal external female genitalia without lesions. Orthotopic urethral meatus. Fairly healthy vaginal mucosa. Grade 2 Cysotcele, + urethral hypermobility  Extremities: moves all equally, no clubbing, cyanosis or edema  Skin: Warm and dry. No lesions  Psych: normal affect  Neuro: Alert and oriented x 3. Cranial nerves II-XII intact      Urinalysis  Negative for blood, LE, nit    Assessment:  1. Stress incontinence in female  Ambulatory referral/consult to Urology    POCT Urinalysis, Dipstick, Automated, W/O Scope    POCT Bladder Scan    Ambulatory referral/consult to Physical/Occupational Therapy      2. [place]-Walker grade 2 cystocele  Ambulatory referral/consult to Physical/Occupational Therapy      3. Mixed incontinence              Plan:   Stress incontinence in female  -     Ambulatory referral/consult to Urology  -     POCT Urinalysis, Dipstick, Automated, W/O Scope  -     POCT Bladder Scan  -     Ambulatory referral/consult to Physical/Occupational Therapy; Future; Expected date: 11/27/2023    [place]-Walker grade 2 cystocele  -     Ambulatory referral/consult to Physical/Occupational Therapy; Future; Expected date: 11/27/2023    Mixed incontinence    Discussed management options, including kegels, PFPT, sling surgery +/- anterior repair. Pt interested in PT      Follow up in about 3 months (around 2/20/2024).

## 2023-11-30 ENCOUNTER — CLINICAL SUPPORT (OUTPATIENT)
Dept: REHABILITATION | Facility: HOSPITAL | Age: 48
End: 2023-11-30
Attending: UROLOGY
Payer: COMMERCIAL

## 2023-11-30 DIAGNOSIS — N81.10 BADEN-WALKER GRADE 2 CYSTOCELE: ICD-10-CM

## 2023-11-30 DIAGNOSIS — N39.3 STRESS INCONTINENCE IN FEMALE: ICD-10-CM

## 2023-11-30 DIAGNOSIS — M62.89 PELVIC FLOOR DYSFUNCTION: Primary | ICD-10-CM

## 2023-11-30 PROCEDURE — 97530 THERAPEUTIC ACTIVITIES: CPT | Mod: PN

## 2023-11-30 PROCEDURE — 97161 PT EVAL LOW COMPLEX 20 MIN: CPT | Mod: PN

## 2023-11-30 NOTE — PROGRESS NOTES
[facility]   Pelvic Health Physical Therapy Initial Evaluation     Date: 2023   Name: Natty Vaughan  Clinic Number: [id number]    Therapy Diagnosis: No diagnosis found.  Physician: Keira Riggins MD    Physician Orders: PT Eval and Treat   Medical Diagnosis from Referral: Stress incontinence in female [N39.3], [place]-Walker grade 2 cystocele [N81.10]   Evaluation Date: 2023  Authorization Period Expiration: 2023  Plan of Care Expiration: 2024  Progress Note Due: 2023  Visit # / Visits authorized:  eval   FOTO: Issued Visit #: 1 /3    Precautions: Standard    Time In: 1:30  Time Out: 2:30  Total Appointment Time (timed & untimed codes): 60 minutes    SUBJECTIVE     Date of onset: awhile    History of current condition - NATTY reports: increased urinary leakage with exercise (moderate amount), on her way to the restroom with a full bladder as well as with cough/sneeze (few drops). She has also noticed leakage with intercourse. She was recently prescribed a hormonal patch. Her first UTI was diagnosed in October. She has been having her IUD for 16 years.       OB/GYN History: , vaginal delivery, episiotomy, vaginal dryness, vaginal erythema, and lichen sclerosus  Sexually active? Yes  Pain with vaginal exams, intercourse or tampon use? No    Bladder/Bowel History: trouble emptying bladder completely, urinary incontinence, and constipation/straining for movement, IBS  Frequency of urination:   Daytime: more than 7           Nighttime: 1-2  Difficulty initiating urine stream: No  Urine stream: strong  Complete emptying: No  Bladder leakage: Yes  Frequency of incidents: almost everyday  Amount leaked (urine): few drops, large squirt, and moderate amount  Urinary Urgency: Yes, Able to delay the urge for at least 30 minute(s).  Frequency of bowel movements: once every other days  Difficulty initiating BM: No  Quality/Shape of BM: [place] Stool Chart Type  "3-4  Does Patient Feel Empty after BM? No  Fiber Supplements or Laxative Use? No. Plexus (probiotics)  Colon leakage: No  Frequency of incidents: 0   Amount leaked (bowels):  none  Form of protection: panty liner while exercising  Number of pads required in 24 hours: 1    Pain:  Patient reporting no pain today.     Imaging: see chart     Prior Therapy: yes   Social History: lives with their family  Current exercise: yes; circuit   Occupation: retired   Prior Level of Function: Pt was independent with all ADLs and iADLs without pain, no reports of incontinence of bowel or bladder.  Current Level of Function: Unable to attend social activities and events due to urinary leakage and frequency    Types of fluid intake: water and soda  Diet: regular    Abuse/Neglect: No     Patients goals: decrease urinary leakage     Medical History: NATTY  has a past medical history of Anemia, Breast cyst, Fever blister, Graves disease, Hypothyroidism, Lichen sclerosus, and Urinary incontinence (2021).     Surgical History: Natty Vaughan  has a past surgical history that includes Salpingoophorectomy (Left, 2008); Colonoscopy (N/A, 02/02/2022); and Shoulder surgery (Right).    Medications: Natty has a current medication list which includes the following prescription(s): ascorbic acid (vitamin c), biotin, buspirone, calcium carbonate/vitamin d3, cetirizine, clobetasol, doxycycline, estradiol 0.05 mg/24 hr td ptsw, ferrous sulfate, hydrocortisone, levonorgestrel, minoxidil, montelukast, multivitamin, np thyroid, np thyroid, phentermine, spironolactone, vitamin b complex, and zinc.    Allergies:   Review of patient's allergies indicates:  No Known Allergies     OBJECTIVE     See EMR under MEDIA for written consent provided 11/30/2023    ORTHO SCREEN  Posture in sitting: WNL  Pelvic alignment: no sign of deviations noted in supine     ABDOMINAL WALL ASSESSMENT  Palpation: tender, hypertonic, and pt reports "pressure" with " palpation  Abdominal strength: Transverse abdominus: fair to good  Scarring: none assessed today  Diastasis: present   Curl-up test for Inter-rectus distance (IRD)    With transverse abdominus contraction: 2 fingers width at the umbilicus, 1.5 fingers width 2 inches above umbilicus, none width 2 inches below umbilicus        BREATHING MECHANICS ASSESSMENT   Thorax Assessment During Quiet Respiration: WNL excursion of ribcage and WNL excursion of abdominal wall  Thorax Assessment During Deep Respiration: Decreased excursion bilaterally of lateral ribs     VAGINAL PELVIC FLOOR EXAM    EXTERNAL ASSESSMENT  Introitus: gaping  Skin condition: redness noted  Scarring: none assessed today (patient reports Grade 5 tear with past pregnancy)   Sensation: WNL   Pain: none  Voluntary contraction: visible lift  Voluntary relaxation: visible drop  Involuntary contraction: nil  Bearing down: bulge      INTERNAL ASSESSMENT  Pain: none   Sensation: able to localized pressure appropriately   Vaginal vault: roomy   Muscle Bulk: atrophy   Muscle Power: 1+/5  Muscle Endurance: 1 sec    Quality of contraction: slow rise   Coordination: tends to hold breath during PFM contration   Prolapse check: anterior vaginal wall weakness ([place]-Walker grade 2 cystocele from referral)    Limitation/Restriction for FOTO Pelvic Floor Survey    Therapist reviewed FOTO scores for Arlyn Vaughan on 11/30/2023.   FOTO documents entered into [employer] - see Media section.    Limitation Score:          TREATMENT     Total Treatment time (time-based codes) separate from Evaluation: 15 minutes     Therapeutic Activity to improve dynamic functional performance for 15 minutes including:    Education on double voiding techniques. Handout given.  Education on urge control techniques. Handout given.  Education on bladder irritants. Bladder diary given to patient to complete.  Reviewed HEP: diaphragmatic breathing, TA contractions     PATIENT EDUCATION AND HOME EXERCISES      Education provided:   general anatomy/physiology of urinary/ bowel  system, benefits of treatment, risks of treatment, and alternative methods of treatment were discussed with the patient. Additionally, bladder irritants, anatomy/physiology of pelvic floor, posture/body mechanices, hygiene of pelvic floor, bladder retraining, diaphragmatic breathing, double voiding techniques, isometric abdominal exercises, kegels, proper bearing down techniques, fluid intake/dietary modifications, behavior modifications, and Coordination of kegels with functional activities such as cough, laugh, sneeze, lift, etc.  were reviewed.     Written Home Exercises provided: yes.  Exercises were reviewed and NATTY was able to demonstrate them prior to the end of the session. NATTY demonstrated good  understanding of the education provided. See EMR under Patient Instructions for exercises provided during therapy sessions.    ASSESSMENT     Natty is a 48 y.o. female referred to outpatient Physical Therapy with a medical diagnosis of Stress incontinence in female [N39.3], [place]-Walker grade 2 cystocele [N81.10] . Pt presents with altered posture, poor knowledge of body mechanics and posture, poor trunk stability, decreased pelvic muscle strength, decreased endurance of the pelvic muscles, decreased phasic ability of the pelvic muscles, poor quality of pelvic muscle contraction, increased frequency of urination, increased nocturia, poor coordination of pelvic floor muscles during ADL's leading to urinary or fecal leakage, poor fluid intake, poor diet, incomplete urination, and dysfunctional voiding.       Patient prognosis is Good.   Patient will benefit from skilled outpatient Physical Therapy to address the deficits stated above and in the chart below, provide patient/family education, and to maximize patient's level of independence.     Plan of care discussed with patient: Yes  Patient's spiritual, cultural and educational needs  considered and patient is agreeable to the plan of care and goals as stated below:     Anticipated Barriers for therapy: see PMHx    Medical Necessity is demonstrated by the following:    History  Co-morbidities and personal factors that may impact the plan of care [x] LOW: no personal factors / co-morbidities  [] MODERATE: 1-2 personal factors / co-morbidities  [] HIGH: 3+ personal factors / co-morbidities    Personal Factors:   no deficits     Examination  Body Structures and Functions, activity limitations and participation restrictions that may impact the plan of care [] LOW: addressing 1-2 elements  [x] MODERATE: 3+ elements  [] HIGH: 4+ elements (please support below)    Moderate / High Support Documentation: altered posture, poor knowledge of body mechanics and posture, poor trunk stability, decreased pelvic muscle strength, decreased endurance of the pelvic muscles, decreased phasic ability of the pelvic muscles, poor quality of pelvic muscle contraction, increased frequency of urination, increased nocturia, poor coordination of pelvic floor muscles during ADL's leading to urinary or fecal leakage, poor fluid intake, poor diet, incomplete urination, and dysfunctional voiding     Clinical Presentation [x] LOW: stable  [] MODERATE: Evolving  [] HIGH: Unstable     Decision Making/ Complexity Score: low        Goals:  Goals:  Short Term Goals: 6 weeks   - Pt will demonstrate excellent knowledge and adherence to HEP to facilitate optimal recovery.  - Pt will demonstrate proper PFM contraction, relaxation, and lengthening coordinated with TA and breath for improved muscle coordination needed for functional activity.  - Pt will demonstrate proper breathing technique to help with calming the nervous system in order to improve abdominal wall and PF musculature extensibility for decreased pain, improved voiding ability, and improved QOL.  - Pt to increase pelvic floor strength to at least 2/5 to demonstrate improved  strength needed for continence with ADLs.   - Pt will report no incidence of urinary incontinence 4/7 days for improved hygiene and ADL/IADL tolerance.   Long Term Goals: 10 weeks   - Pt will demonstrate excellent knowledge and adherence to HEP for continued self-maintenance of symptoms.  - Pt will report PFDI Urianry FOTO score of 0% limited indicating clinically relevant increase in function.  - Pt will report voiding interval of 2-3 hours for improved ADL tolerance.  - Pt will report no incidence of urinary incontinence 6/7 days for improved hygiene and ADL/IADL tolerance.   - Pt will demonstrate PFM strength of at least 3/5 MMT for improved strength needed to maintain continence.   - Pt to be able to perform a 3 second kegel x 10 reps with good quality to demonstrate improving strength and endurance needed for continence.  - Pt will report bearing down appropriately 100% of the time for improved bowel function and decreased stress on adjacent pelvic structures.   - Pt will be able to successfully complete sexual intercourse without pain for improved ADL tolerance.   - Pt will report ability to tolerate speculum exam without pain for improved access to healthcare.  - Pt to be educated in pelvic muscle bracing and be able to consistently perform correctly and quickly to help decrease incontinence with cough/laugh/sneeze.  - Pt will be able to correctly and consistently explain urge control strategies to demonstrate understanding of these strategies, decrease likelihood of leakage, and increase time between voids.  - Pt to report improved restorative sleeping, waking up no more than 0-1x/night due to urge to urinate.  - Pt will report ability to successfully perform double voiding for complete urinary emptying after initial void to prevent immediate return to bathroom.    PLAN     Plan of care Certification: 11/30/2023 to 02/08/2024.    Outpatient Physical Therapy 1 time(s) every 1-2 week(s) for 10 weeks to include  the following interventions: Electrical Stimulation TENS/IFC, Manual Therapy, Moist Heat/ Ice, Neuromuscular Re-ed, Patient Education, Self Care, Therapeutic Activities, and Therapeutic Exercise, FDN, and ASTYM.     Ash Phoenix, PT      I CERTIFY THE NEED FOR THESE SERVICES FURNISHED UNDER THIS PLAN OF TREATMENT AND WHILE UNDER MY CARE   Physician's comments:     Physician's Signature: ___________________________________________________

## 2023-12-01 NOTE — PATIENT INSTRUCTIONS
"  DOUBLE VOIDING    Sometimes after you urinate, you may feel the urge to go again immediately or soon after. However, when you go back to the bathroom, only a few drops come out. This can be due to incomplete emptying of the bladder. Double voiding is a technique that may assist the bladder to empty more effectively when urine is left in the bladder at the end of urination. It involves passing urine more than once each time that you go to the toilet. This makes sure that the bladder is completely empty.    Here are 3 strategies you can try to fully empty your bladder.  You do not have to do all of these things every single time. Find which ones work best for you.     Check to make sure your pelvic floor is relaxed, which is required for voiding completely.   Do a body scan - make sure your legs, buttocks, and abdominals are relaxed.  Take a couple deep, slow breaths to encourage your pelvic floor muscles to DROP (i.e., try diaphragmatic breathing).  Gently apply pressure over your bladder.  Change your pelvic position: lean forward, rock your pelvis forward and backward 2x and side to side 2x, stand up CHCF then sit back down 2x.     Wait at least 15-30 seconds to see if a second urine stream begins. If not, you may get up and leave the bathroom.      URINARY URGE CONTROL   What to do when you "gotta go, gotta go"     FREEZE, BREATHE, SQUEEZE, repeat  Do this when you experience a strong urge to urinate and feel like you are going to leak to stop yourself from leaking.      FIRST - FREEZE: Do your best not to panic or rush to the toilet! You are more likely to leak if you do. Stop whatever you are doing, stand or sit quietly, and stay as calm as possible.     SECOND - BREATHE: Relax and take a few good deep breaths, letting it out slowly. This helps you further calm the nervous system and settles your bladder. Try thinking of something else to distract yourself from the urge (example: list categories of items " "like animals, fruits, cars, etc.)     THIRD - SQUEEZE: Do 5-10 "Quick Flicks" (quick LIFT and squeeze of pelvic floor muscles, followed by a full DROP). Pelvic floor contractions send a message to the bladder to relax and hold urine. Continue doing your best to remain calm and distract yourself from the urge.     FINALLY - REPEAT: Repeat this as many times as you need to on the way to the bathroom (i.e., every time the urge comes back). We only want to be walking calmly to the bathroom once the urge has passed. When you get inside and close the door behind you, repeat this process one last time so that you have enough time to get to the toilet and pull your pants down without rushing.        Remember......"Control your bladder before it controls YOU!"       "

## 2023-12-01 NOTE — PLAN OF CARE
[facility]   Pelvic Health Physical Therapy Initial Evaluation     Date: 2023   Name: Natty Vaughan  Clinic Number: [id number]    Therapy Diagnosis: No diagnosis found.  Physician: Keira Riggins MD    Physician Orders: PT Eval and Treat   Medical Diagnosis from Referral: Stress incontinence in female [N39.3], [place]-Walker grade 2 cystocele [N81.10]   Evaluation Date: 2023  Authorization Period Expiration: 2023  Plan of Care Expiration: 2024  Progress Note Due: 2023  Visit # / Visits authorized:  eval   FOTO: Issued Visit #: 1 /3    Precautions: Standard    Time In: 1:30  Time Out: 2:30  Total Appointment Time (timed & untimed codes): 60 minutes    SUBJECTIVE     Date of onset: awhile    History of current condition - NATTY reports: increased urinary leakage with exercise (moderate amount), on her way to the restroom with a full bladder as well as with cough/sneeze (few drops). She has also noticed leakage with intercourse. She was recently prescribed a hormonal patch. Her first UTI was diagnosed in October. She has been having her IUD for 16 years.       OB/GYN History: , vaginal delivery, episiotomy, vaginal dryness, vaginal erythema, and lichen sclerosus  Sexually active? Yes  Pain with vaginal exams, intercourse or tampon use? No    Bladder/Bowel History: trouble emptying bladder completely, urinary incontinence, and constipation/straining for movement, IBS  Frequency of urination:   Daytime: more than 7           Nighttime: 1-2  Difficulty initiating urine stream: No  Urine stream: strong  Complete emptying: No  Bladder leakage: Yes  Frequency of incidents: almost everyday  Amount leaked (urine): few drops, large squirt, and moderate amount  Urinary Urgency: Yes, Able to delay the urge for at least 30 minute(s).  Frequency of bowel movements: once every other days  Difficulty initiating BM: No  Quality/Shape of BM: [place] Stool Chart Type  "3-4  Does Patient Feel Empty after BM? No  Fiber Supplements or Laxative Use? No. Plexus (probiotics)  Colon leakage: No  Frequency of incidents: 0   Amount leaked (bowels): none  Form of protection: panty liner while exercising  Number of pads required in 24 hours: 1    Pain:  Patient reporting no pain today.     Imaging: see chart     Prior Therapy: yes   Social History: lives with their family  Current exercise: yes; circuit   Occupation: retired   Prior Level of Function: Pt was independent with all ADLs and iADLs without pain, no reports of incontinence of bowel or bladder.  Current Level of Function: Unable to attend social activities and events due to urinary leakage and frequency    Types of fluid intake: water and soda  Diet: regular    Abuse/Neglect: No     Patients goals: decrease urinary leakage     Medical History: NATTY  has a past medical history of Anemia, Breast cyst, Fever blister, Graves disease, Hypothyroidism, Lichen sclerosus, and Urinary incontinence (2021).     Surgical History: Natty Vaughan  has a past surgical history that includes Salpingoophorectomy (Left, 2008); Colonoscopy (N/A, 02/02/2022); and Shoulder surgery (Right).    Medications: Natty has a current medication list which includes the following prescription(s): ascorbic acid (vitamin c), biotin, buspirone, calcium carbonate/vitamin d3, cetirizine, clobetasol, doxycycline, estradiol 0.05 mg/24 hr td ptsw, ferrous sulfate, hydrocortisone, levonorgestrel, minoxidil, montelukast, multivitamin, np thyroid, np thyroid, phentermine, spironolactone, vitamin b complex, and zinc.    Allergies:   Review of patient's allergies indicates:  No Known Allergies     OBJECTIVE     See EMR under MEDIA for written consent provided 11/30/2023    ORTHO SCREEN  Posture in sitting: WNL  Pelvic alignment: no sign of deviations noted in supine     ABDOMINAL WALL ASSESSMENT  Palpation: tender, hypertonic, and pt reports "pressure" with " palpation  Abdominal strength: Transverse abdominus: fair to good  Scarring: none assessed today  Diastasis: present   Curl-up test for Inter-rectus distance (IRD)    With transverse abdominus contraction: 2 fingers width at the umbilicus, 1.5 fingers width 2 inches above umbilicus, none width 2 inches below umbilicus        BREATHING MECHANICS ASSESSMENT   Thorax Assessment During Quiet Respiration: WNL excursion of ribcage and WNL excursion of abdominal wall  Thorax Assessment During Deep Respiration: Decreased excursion bilaterally of lateral ribs     VAGINAL PELVIC FLOOR EXAM    EXTERNAL ASSESSMENT  Introitus: gaping  Skin condition: redness noted  Scarring: none assessed today (patient reports Grade 5 tear with past pregnancy)   Sensation: WNL   Pain: none  Voluntary contraction: visible lift  Voluntary relaxation: visible drop  Involuntary contraction: nil  Bearing down: bulge      INTERNAL ASSESSMENT  Pain: none   Sensation: able to localized pressure appropriately   Vaginal vault: roomy   Muscle Bulk: atrophy   Muscle Power: 1+/5  Muscle Endurance: 1 sec    Quality of contraction: slow rise   Coordination: tends to hold breath during PFM contration   Prolapse check: anterior vaginal wall weakness ([place]-Walker grade 2 cystocele from referral)    Limitation/Restriction for FOTO Pelvic Floor Survey    Therapist reviewed FOTO scores for Arlyn Vaughan on 11/30/2023.   FOTO documents entered into [employer] - see Media section.    Limitation Score:          TREATMENT     Total Treatment time (time-based codes) separate from Evaluation: 15 minutes     Therapeutic Activity to improve dynamic functional performance for 15 minutes including:    Education on double voiding techniques. Handout given.  Education on urge control techniques. Handout given.  Education on bladder irritants. Bladder diary given to patient to complete.  Reviewed HEP: diaphragmatic breathing, TA contractions     PATIENT EDUCATION AND HOME EXERCISES      Education provided:   general anatomy/physiology of urinary/ bowel  system, benefits of treatment, risks of treatment, and alternative methods of treatment were discussed with the patient. Additionally, bladder irritants, anatomy/physiology of pelvic floor, posture/body mechanices, hygiene of pelvic floor, bladder retraining, diaphragmatic breathing, double voiding techniques, isometric abdominal exercises, kegels, proper bearing down techniques, fluid intake/dietary modifications, behavior modifications, and Coordination of kegels with functional activities such as cough, laugh, sneeze, lift, etc.  were reviewed.     Written Home Exercises provided: yes.  Exercises were reviewed and NATTY was able to demonstrate them prior to the end of the session. NATTY demonstrated good  understanding of the education provided. See EMR under Patient Instructions for exercises provided during therapy sessions.    ASSESSMENT     Natty is a 48 y.o. female referred to outpatient Physical Therapy with a medical diagnosis of Stress incontinence in female [N39.3], [place]-Walker grade 2 cystocele [N81.10] . Pt presents with altered posture, poor knowledge of body mechanics and posture, poor trunk stability, decreased pelvic muscle strength, decreased endurance of the pelvic muscles, decreased phasic ability of the pelvic muscles, poor quality of pelvic muscle contraction, increased frequency of urination, increased nocturia, poor coordination of pelvic floor muscles during ADL's leading to urinary or fecal leakage, poor fluid intake, poor diet, incomplete urination, and dysfunctional voiding.       Patient prognosis is Good.   Patient will benefit from skilled outpatient Physical Therapy to address the deficits stated above and in the chart below, provide patient/family education, and to maximize patient's level of independence.     Plan of care discussed with patient: Yes  Patient's spiritual, cultural and educational needs  considered and patient is agreeable to the plan of care and goals as stated below:     Anticipated Barriers for therapy: see PMHx    Medical Necessity is demonstrated by the following:    History  Co-morbidities and personal factors that may impact the plan of care [x] LOW: no personal factors / co-morbidities  [] MODERATE: 1-2 personal factors / co-morbidities  [] HIGH: 3+ personal factors / co-morbidities    Personal Factors:   no deficits     Examination  Body Structures and Functions, activity limitations and participation restrictions that may impact the plan of care [] LOW: addressing 1-2 elements  [x] MODERATE: 3+ elements  [] HIGH: 4+ elements (please support below)    Moderate / High Support Documentation: altered posture, poor knowledge of body mechanics and posture, poor trunk stability, decreased pelvic muscle strength, decreased endurance of the pelvic muscles, decreased phasic ability of the pelvic muscles, poor quality of pelvic muscle contraction, increased frequency of urination, increased nocturia, poor coordination of pelvic floor muscles during ADL's leading to urinary or fecal leakage, poor fluid intake, poor diet, incomplete urination, and dysfunctional voiding     Clinical Presentation [x] LOW: stable  [] MODERATE: Evolving  [] HIGH: Unstable     Decision Making/ Complexity Score: low        Goals:  Goals:  Short Term Goals: 6 weeks   - Pt will demonstrate excellent knowledge and adherence to HEP to facilitate optimal recovery.  - Pt will demonstrate proper PFM contraction, relaxation, and lengthening coordinated with TA and breath for improved muscle coordination needed for functional activity.  - Pt will demonstrate proper breathing technique to help with calming the nervous system in order to improve abdominal wall and PF musculature extensibility for decreased pain, improved voiding ability, and improved QOL.  - Pt to increase pelvic floor strength to at least 2/5 to demonstrate improved  strength needed for continence with ADLs.   - Pt will report no incidence of urinary incontinence 4/7 days for improved hygiene and ADL/IADL tolerance.   Long Term Goals: 10 weeks   - Pt will demonstrate excellent knowledge and adherence to HEP for continued self-maintenance of symptoms.  - Pt will report PFDI Urianry FOTO score of 0% limited indicating clinically relevant increase in function.  - Pt will report voiding interval of 2-3 hours for improved ADL tolerance.  - Pt will report no incidence of urinary incontinence 6/7 days for improved hygiene and ADL/IADL tolerance.   - Pt will demonstrate PFM strength of at least 3/5 MMT for improved strength needed to maintain continence.   - Pt to be able to perform a 3 second kegel x 10 reps with good quality to demonstrate improving strength and endurance needed for continence.  - Pt will report bearing down appropriately 100% of the time for improved bowel function and decreased stress on adjacent pelvic structures.   - Pt will be able to successfully complete sexual intercourse without pain for improved ADL tolerance.   - Pt will report ability to tolerate speculum exam without pain for improved access to healthcare.  - Pt to be educated in pelvic muscle bracing and be able to consistently perform correctly and quickly to help decrease incontinence with cough/laugh/sneeze.  - Pt will be able to correctly and consistently explain urge control strategies to demonstrate understanding of these strategies, decrease likelihood of leakage, and increase time between voids.  - Pt to report improved restorative sleeping, waking up no more than 0-1x/night due to urge to urinate.  - Pt will report ability to successfully perform double voiding for complete urinary emptying after initial void to prevent immediate return to bathroom.    PLAN     Plan of care Certification: 11/30/2023 to 02/08/2024.    Outpatient Physical Therapy 1 time(s) every 1-2 week(s) for 10 weeks to include  the following interventions: Electrical Stimulation TENS/IFC, Manual Therapy, Moist Heat/ Ice, Neuromuscular Re-ed, Patient Education, Self Care, Therapeutic Activities, and Therapeutic Exercise, FDN, and ASTYM.     Ash Phoenix, PT      I CERTIFY THE NEED FOR THESE SERVICES FURNISHED UNDER THIS PLAN OF TREATMENT AND WHILE UNDER MY CARE   Physician's comments:     Physician's Signature: ___________________________________________________

## 2023-12-12 ENCOUNTER — CLINICAL SUPPORT (OUTPATIENT)
Dept: REHABILITATION | Facility: HOSPITAL | Age: 48
End: 2023-12-12
Payer: COMMERCIAL

## 2023-12-12 DIAGNOSIS — M62.89 PELVIC FLOOR DYSFUNCTION: Primary | ICD-10-CM

## 2023-12-12 PROCEDURE — 97112 NEUROMUSCULAR REEDUCATION: CPT | Mod: PN

## 2023-12-12 NOTE — PROGRESS NOTES
"  Pelvic Health Physical Therapy   Treatment Note     Name: Natty [facility] Number: [id number]    Therapy Diagnosis:   Encounter Diagnosis   Name Primary?    Pelvic floor dysfunction Yes     Physician: Keira Riggins MD    Visit Date: 12/12/2023    Physician Orders: PT Eval and Treat   Medical Diagnosis from Referral: Stress incontinence in female [N39.3], [place]-Walker grade 2 cystocele [N81.10]   Evaluation Date: 11/30/2023  Authorization Period Expiration: 12/01/2024  Plan of Care Expiration: 02/08/2024  Progress Note Due: 12/30/2023  Visit # / Visits authorized: 1/20 + eval   FOTO: Issued Visit #: 1 /3     Precautions: Standard    Time In: 2:14  Time Out: 3:10  Total Billable Time: 51 minutes    Precautions: Standard    Subjective     Pt reports: about the same amount of leakage. Double voiding has not been helping as much, however urge control quick flicks helps sometimes.   She was compliant with home exercise program.  Response to previous treatment: 1st treatment since initial evaluation   Functional change: no change     Pain: 0/10  Location: generalized    Objective     Objective Measures updated at progress report unless specified.     Treatment     NATTY participated in neuromuscular re-education activities to develop Coordination, Control, Down training, Proprioception, and Sense for 51 minutes including:     Diaphragmatic breathing   TA contractions x10 - cues for "blowing out the candle" to decrease breath holding  Posterior pelvic tilts with diaphragmatic breathing x15  SL hip abduction x15 ea.   Bridges with ball + TA contractions + DB  Supine hamstring stretch   Kegels with layers   - supine x10 each layer  - seated   - supported standing     Home Exercises Provided and Patient Education Provided     Education provided:   - bladder irritants, anatomy/physiology of pelvic floor, posture/body mechanices, bladder retraining, diaphragmatic breathing, double voiding techniques, isometric " abdominal exercises, kegels, proper bearing down techniques, fluid intake/dietary modifications, and Coordination of kegels with functional activities such as cough, laugh, sneeze, lift, etc.   Discussed progression of plan of care with patient; educated pt in activity modification; reviewed HEP with pt. Pt demonstrated and verbalized understanding of all instruction and was provided with a handout of HEP (see Patient Instructions).    Written Home Exercises Provided: Patient instructed to cont prior HEP.  Exercises were reviewed and NATTY was able to demonstrate them prior to the end of the session.  NATTY demonstrated good  understanding of the education provided.     See EMR under Patient Instructions for exercises provided 12/12/2023 and prior visit.    Assessment     Radha tolerated therapy session well with no new complaints. Focused on overall lower extremity strengthening as well as flexibility due to reports of increased muscle tension. Introduced Kegels with layers to patient to improve coordination of pelvic floor muscle contraction. Verbal cues given to decrease accessory muscle use as well as decrease breath holding. Will perform biofeedback next visit. Continue progressing in POC as tolerated.   NATTY Is progressing well towards her goals.   Pt prognosis is Good.     Pt will continue to benefit from skilled outpatient physical therapy to address the deficits listed in the problem list box on initial evaluation, provide pt/family education and to maximize pt's level of independence in the home and community environment.     Pt's spiritual, cultural and educational needs considered and pt agreeable to plan of care and goals.     Anticipated barriers to physical therapy: none    Goals:  Short Term Goals: 6 weeks   - Pt will demonstrate excellent knowledge and adherence to HEP to facilitate optimal recovery. Progressing  - Pt will demonstrate proper PFM contraction, relaxation, and lengthening  coordinated with TA and breath for improved muscle coordination needed for functional activity. Progressing  - Pt will demonstrate proper breathing technique to help with calming the nervous system in order to improve abdominal wall and PF musculature extensibility for decreased pain, improved voiding ability, and improved QOL. Progressing  - Pt to increase pelvic floor strength to at least 2/5 to demonstrate improved strength needed for continence with ADLs. Progressing  - Pt will report no incidence of urinary incontinence 4/7 days for improved hygiene and ADL/IADL tolerance. Progressing    Long Term Goals: 10 weeks   - Pt will demonstrate excellent knowledge and adherence to HEP for continued self-maintenance of symptoms. Progressing  - Pt will report PFDI Urianry FOTO score of 0% limited indicating clinically relevant increase in function. Progressing  - Pt will report voiding interval of 2-3 hours for improved ADL tolerance. Progressing  - Pt will report no incidence of urinary incontinence 6/7 days for improved hygiene and ADL/IADL tolerance. Progressing  - Pt will demonstrate PFM strength of at least 3/5 MMT for improved strength needed to maintain continence. Progressing  - Pt to be able to perform a 3 second kegel x 10 reps with good quality to demonstrate improving strength and endurance needed for continence.Progressing  - Pt will report bearing down appropriately 100% of the time for improved bowel function and decreased stress on adjacent pelvic structures. Progressing  - Pt will be able to successfully complete sexual intercourse without pain for improved ADL tolerance. Progressing  - Pt will report ability to tolerate speculum exam without pain for improved access to healthcare. Progressing  - Pt to be educated in pelvic muscle bracing and be able to consistently perform correctly and quickly to help decrease incontinence with cough/laugh/sneeze. Progressing  - Pt will be able to correctly and  consistently explain urge control strategies to demonstrate understanding of these strategies, decrease likelihood of leakage, and increase time between voids. Progressing  - Pt to report improved restorative sleeping, waking up no more than 0-1x/night due to urge to urinate. Progressing  - Pt will report ability to successfully perform double voiding for complete urinary emptying after initial void to prevent immediate return to bathroom. Progressing     PLAN      Plan of care Certification: 11/30/2023 to 02/08/2024.     Outpatient Physical Therapy 1 time(s) every 1-2 week(s) for 10 weeks to include the following interventions: Electrical Stimulation TENS/IFC, Manual Therapy, Moist Heat/ Ice, Neuromuscular Re-ed, Patient Education, Self Care, Therapeutic Activities, and Therapeutic Exercise, FDN, and ASTYM.     Ash Phoenix, PT

## 2023-12-27 NOTE — PROGRESS NOTES
"  Pelvic Health Physical Therapy   Treatment Note     Name: Natty [place]  Clinic Number: [id number]    Therapy Diagnosis:   Encounter Diagnosis   Name Primary?    Pelvic floor dysfunction Yes       Physician: Keira Riggins MD    Visit Date: 12/28/2023    Physician Orders: PT Eval and Treat   Medical Diagnosis from Referral: Stress incontinence in female [N39.3], [place]-Walker grade 2 cystocele [N81.10]   Evaluation Date: 11/30/2023  Authorization Period Expiration: 12/01/2024  Plan of Care Expiration: 02/08/2024  Progress Note Due: 12/30/2023  Visit # / Visits authorized: 2/20 + eval   FOTO: Issued Visit #: 1 /3 NEXT VISIT     Precautions: Standard    Time In: 11:20  Time Out: 12:08  Total Billable Time: 48 minutes    Precautions: Standard    Subjective     Pt reports: was sick this past weekend. Increased leakage due to increased frequency in coughs.   She was compliant with home exercise program.  Response to previous treatment: increased leakage  Functional change: no change     Pain: 0/10   Location: generalized    Objective     Objective Measures updated at progress report unless specified.     Treatment     NATTY participated in neuromuscular re-education activities to develop Coordination, Control, Down training, Proprioception, and Sense for 38 minutes including:     Biofeedback sEMG:  Supine Kegels 2w, 4r, 10 reps  Supine Kegels 5w, 4r, 10 reps  Kegels with cough  Seated Kegels 2w, 4r 10 reps  Seated Kegels 5w, 4r, 10 reps  Standing Kegels 2w, 4r, 10 reps      Not today:  Diaphragmatic breathing   TA contractions x10 - cues for "blowing out the candle" to decrease breath holding  Posterior pelvic tilts with diaphragmatic breathing x15  SL hip abduction x15 ea.   Bridges with ball + TA contractions + DB  Supine hamstring stretch   Kegels with layers   - supine x10 each layer  - seated   - supported standing       NATTY participated in dynamic functional therapeutic activities to improve functional " performance for 10  minutes, including:    Reviewed HEP:  Paloff press green ThreaBand x15 ea,  SL hip abduction x15 ea.        Home Exercises Provided and Patient Education Provided     Education provided:   - bladder irritants, anatomy/physiology of pelvic floor, posture/body mechanices, bladder retraining, diaphragmatic breathing, double voiding techniques, isometric abdominal exercises, kegels, proper bearing down techniques, fluid intake/dietary modifications, and Coordination of kegels with functional activities such as cough, laugh, sneeze, lift, etc.   Discussed progression of plan of care with patient; educated pt in activity modification; reviewed HEP with pt. Pt demonstrated and verbalized understanding of all instruction and was provided with a handout of HEP (see Patient Instructions).    Written Home Exercises Provided: Patient instructed to cont prior HEP.  Exercises were reviewed and NATTY was able to demonstrate them prior to the end of the session.  NATTY demonstrated good  understanding of the education provided.     See EMR under Patient Instructions for exercises provided 12/12/2023 and prior visit.    Assessment     Natty presents to therapy today with reports of increased urinary leakage while coughing due to being sick. She reports increased usage of panty liners recently to 3-4 and before she would use 0-1 a day. Introduced biofeedback sEMG with good tolerance. Noted decrease in pelvic floor endurance with held contraction. No accessory muscle use noted. New HEP given for patient to work on core strengthening with proper breathing and hip strengthening. Continue progressing in POC as tolerated.     NATTY Is progressing well towards her goals.   Pt prognosis is Good.     Pt will continue to benefit from skilled outpatient physical therapy to address the deficits listed in the problem list box on initial evaluation, provide pt/family education and to maximize pt's level of independence in  the home and community environment.     Pt's spiritual, cultural and educational needs considered and pt agreeable to plan of care and goals.     Anticipated barriers to physical therapy: none    Goals:  Short Term Goals: 6 weeks   - Pt will demonstrate excellent knowledge and adherence to HEP to facilitate optimal recovery. Progressing  - Pt will demonstrate proper PFM contraction, relaxation, and lengthening coordinated with TA and breath for improved muscle coordination needed for functional activity. Progressing  - Pt will demonstrate proper breathing technique to help with calming the nervous system in order to improve abdominal wall and PF musculature extensibility for decreased pain, improved voiding ability, and improved QOL. Progressing  - Pt to increase pelvic floor strength to at least 2/5 to demonstrate improved strength needed for continence with ADLs. Progressing  - Pt will report no incidence of urinary incontinence 4/7 days for improved hygiene and ADL/IADL tolerance. Progressing    Long Term Goals: 10 weeks   - Pt will demonstrate excellent knowledge and adherence to HEP for continued self-maintenance of symptoms. Progressing  - Pt will report PFDI Urianry FOTO score of 0% limited indicating clinically relevant increase in function. Progressing  - Pt will report voiding interval of 2-3 hours for improved ADL tolerance. Progressing  - Pt will report no incidence of urinary incontinence 6/7 days for improved hygiene and ADL/IADL tolerance. Progressing  - Pt will demonstrate PFM strength of at least 3/5 MMT for improved strength needed to maintain continence. Progressing  - Pt to be able to perform a 3 second kegel x 10 reps with good quality to demonstrate improving strength and endurance needed for continence.Progressing  - Pt will report bearing down appropriately 100% of the time for improved bowel function and decreased stress on adjacent pelvic structures. Progressing  - Pt will be able to  successfully complete sexual intercourse without pain for improved ADL tolerance. Progressing  - Pt will report ability to tolerate speculum exam without pain for improved access to healthcare. Progressing  - Pt to be educated in pelvic muscle bracing and be able to consistently perform correctly and quickly to help decrease incontinence with cough/laugh/sneeze. Progressing  - Pt will be able to correctly and consistently explain urge control strategies to demonstrate understanding of these strategies, decrease likelihood of leakage, and increase time between voids. Progressing  - Pt to report improved restorative sleeping, waking up no more than 0-1x/night due to urge to urinate. Progressing  - Pt will report ability to successfully perform double voiding for complete urinary emptying after initial void to prevent immediate return to bathroom. Progressing     PLAN      Plan of care Certification: 11/30/2023 to 02/08/2024.     Outpatient Physical Therapy 1 time(s) every 1-2 week(s) for 10 weeks to include the following interventions: Electrical Stimulation TENS/IFC, Manual Therapy, Moist Heat/ Ice, Neuromuscular Re-ed, Patient Education, Self Care, Therapeutic Activities, and Therapeutic Exercise, FDN, and ASTYM.     Ash Phoenix, PT

## 2023-12-28 ENCOUNTER — CLINICAL SUPPORT (OUTPATIENT)
Dept: REHABILITATION | Facility: HOSPITAL | Age: 48
End: 2023-12-28
Payer: COMMERCIAL

## 2023-12-28 DIAGNOSIS — M62.89 PELVIC FLOOR DYSFUNCTION: Primary | ICD-10-CM

## 2023-12-28 PROCEDURE — 97112 NEUROMUSCULAR REEDUCATION: CPT | Mod: PN

## 2023-12-28 PROCEDURE — 97530 THERAPEUTIC ACTIVITIES: CPT | Mod: PN

## 2024-01-04 NOTE — PROGRESS NOTES
"  Pelvic Health Physical Therapy   Treatment Note     Name: Natty [place]  Clinic Number: [id number]    Therapy Diagnosis:   No diagnosis found.      Physician: Keira Riggins MD    Visit Date: 1/5/2024    Physician Orders: PT Eval and Treat   Medical Diagnosis from Referral: Stress incontinence in female [N39.3], [place]-Walker grade 2 cystocele [N81.10]   Evaluation Date: 11/30/2023  Authorization Period Expiration: 12/01/2024  Plan of Care Expiration: 02/08/2024  Progress Note Due: 12/30/2023  Visit # / Visits authorized: 1/20 ; 2/20 + eval   FOTO: Issued Visit #: 1 /3 TODAY ***     Precautions: Standard    Time In: ***  Time Out: ***  Total Billable Time: *** minutes    Precautions: Standard    Subjective     Pt reports: *** was sick this past weekend. Increased leakage due to increased frequency in coughs.   She was compliant with home exercise program.  Response to previous treatment: increased leakage  Functional change: no change     Pain: 0/10 ***  Location: generalized    Objective     Objective Measures updated at progress report unless specified.     Treatment     NATTY participated in neuromuscular re-education activities to develop Coordination, Control, Down training, Proprioception, and Sense for *** minutes including:     Biofeedback sEMG:  Supine Kegels 2w, 4r, 10 reps  Supine Kegels 5w, 4r, 10 reps  Kegels with cough  Seated Kegels 2w, 4r 10 reps  Seated Kegels 5w, 4r, 10 reps  Standing Kegels 2w, 4r, 10 reps      Not today:  Diaphragmatic breathing   TA contractions x10 - cues for "blowing out the candle" to decrease breath holding  Posterior pelvic tilts with diaphragmatic breathing x15  SL hip abduction x15 ea.   Bridges with ball + TA contractions + DB  Supine hamstring stretch   Kegels with layers   - supine x10 each layer  - seated   - supported standing       NATTY participated in dynamic functional therapeutic activities to improve functional performance for *** minutes, " including:    Reviewed HEP:  Paloff press green ThreaBand x15 ea,  SL hip abduction x15 ea.        Home Exercises Provided and Patient Education Provided     Education provided:   - bladder irritants, anatomy/physiology of pelvic floor, posture/body mechanices, bladder retraining, diaphragmatic breathing, double voiding techniques, isometric abdominal exercises, kegels, proper bearing down techniques, fluid intake/dietary modifications, and Coordination of kegels with functional activities such as cough, laugh, sneeze, lift, etc.   Discussed progression of plan of care with patient; educated pt in activity modification; reviewed HEP with pt. Pt demonstrated and verbalized understanding of all instruction and was provided with a handout of HEP (see Patient Instructions).    Written Home Exercises Provided: Patient instructed to cont prior HEP.  Exercises were reviewed and NATTY was able to demonstrate them prior to the end of the session.  NATTY demonstrated good  understanding of the education provided.     See EMR under Patient Instructions for exercises provided 12/12/2023 and prior visit.    Assessment     *** Natty presents to therapy today with reports of increased urinary leakage while coughing due to being sick. She reports increased usage of panty liners recently to 3-4 and before she would use 0-1 a day. Introduced biofeedback sEMG with good tolerance. Noted decrease in pelvic floor endurance with held contraction. No accessory muscle use noted. New HEP given for patient to work on core strengthening with proper breathing and hip strengthening. Continue progressing in POC as tolerated.     NATTY Is progressing well towards her goals.   Pt prognosis is Good.     Pt will continue to benefit from skilled outpatient physical therapy to address the deficits listed in the problem list box on initial evaluation, provide pt/family education and to maximize pt's level of independence in the home and community  environment.     Pt's spiritual, cultural and educational needs considered and pt agreeable to plan of care and goals.     Anticipated barriers to physical therapy: none    Goals:  Short Term Goals: 6 weeks   - Pt will demonstrate excellent knowledge and adherence to HEP to facilitate optimal recovery. Progressing  - Pt will demonstrate proper PFM contraction, relaxation, and lengthening coordinated with TA and breath for improved muscle coordination needed for functional activity. Progressing  - Pt will demonstrate proper breathing technique to help with calming the nervous system in order to improve abdominal wall and PF musculature extensibility for decreased pain, improved voiding ability, and improved QOL. Progressing  - Pt to increase pelvic floor strength to at least 2/5 to demonstrate improved strength needed for continence with ADLs. Progressing  - Pt will report no incidence of urinary incontinence 4/7 days for improved hygiene and ADL/IADL tolerance. Progressing    Long Term Goals: 10 weeks   - Pt will demonstrate excellent knowledge and adherence to HEP for continued self-maintenance of symptoms. Progressing  - Pt will report PFDI Urianry FOTO score of 0% limited indicating clinically relevant increase in function. Progressing  - Pt will report voiding interval of 2-3 hours for improved ADL tolerance. Progressing  - Pt will report no incidence of urinary incontinence 6/7 days for improved hygiene and ADL/IADL tolerance. Progressing  - Pt will demonstrate PFM strength of at least 3/5 MMT for improved strength needed to maintain continence. Progressing  - Pt to be able to perform a 3 second kegel x 10 reps with good quality to demonstrate improving strength and endurance needed for continence.Progressing  - Pt will report bearing down appropriately 100% of the time for improved bowel function and decreased stress on adjacent pelvic structures. Progressing  - Pt will be able to successfully complete sexual  intercourse without pain for improved ADL tolerance. Progressing  - Pt will report ability to tolerate speculum exam without pain for improved access to healthcare. Progressing  - Pt to be educated in pelvic muscle bracing and be able to consistently perform correctly and quickly to help decrease incontinence with cough/laugh/sneeze. Progressing  - Pt will be able to correctly and consistently explain urge control strategies to demonstrate understanding of these strategies, decrease likelihood of leakage, and increase time between voids. Progressing  - Pt to report improved restorative sleeping, waking up no more than 0-1x/night due to urge to urinate. Progressing  - Pt will report ability to successfully perform double voiding for complete urinary emptying after initial void to prevent immediate return to bathroom. Progressing     PLAN      Plan of care Certification: 11/30/2023 to 02/08/2024.     Outpatient Physical Therapy 1 time(s) every 1-2 week(s) for 10 weeks to include the following interventions: Electrical Stimulation TENS/IFC, Manual Therapy, Moist Heat/ Ice, Neuromuscular Re-ed, Patient Education, Self Care, Therapeutic Activities, and Therapeutic Exercise, FDN, and ASTYM.     Ash Phoenix, PT

## 2024-01-05 ENCOUNTER — CLINICAL SUPPORT (OUTPATIENT)
Dept: REHABILITATION | Facility: HOSPITAL | Age: 49
End: 2024-01-05
Payer: COMMERCIAL

## 2024-01-05 DIAGNOSIS — M62.89 PELVIC FLOOR DYSFUNCTION: Primary | ICD-10-CM

## 2024-01-05 PROCEDURE — 97112 NEUROMUSCULAR REEDUCATION: CPT | Mod: PN,CQ

## 2024-01-05 NOTE — PROGRESS NOTES
"  Pelvic Health Physical Therapy   Treatment Note     Name: Natty [facility] Number: [id number]    Therapy Diagnosis:   Encounter Diagnosis   Name Primary?    Pelvic floor dysfunction Yes         Physician: Keira Riggins MD    Visit Date: 1/5/2024    Physician Orders: PT Eval and Treat   Medical Diagnosis from Referral: Stress incontinence in female [N39.3], [place]-Walker grade 2 cystocele [N81.10]   Evaluation Date: 11/30/2023  Authorization Period Expiration: 12/01/2024  Plan of Care Expiration: 02/08/2024  Progress Note Due: NEXT VISIT  Visit # / Visits authorized: 1/20 ; 2/20 + eval   FOTO: Issued Visit #: 1 /3      Precautions: Standard    Time In: 11:00  Time Out: 11:45  Total Billable Time: 45 minutes    Precautions: Standard    Subjective     Pt reports: she was sick and still coughing last week. She is seeing a chiropractor for low back pain and has been doing similar exercises.  She was compliant with home exercise program.  Response to previous treatment: increased leakage  Functional change: no change     Pain: 0/10   Location: generalized    Objective     Objective Measures updated at progress report unless specified.     Treatment     NATTY participated in neuromuscular re-education activities to develop Coordination, Control, Down training, Proprioception, and Sense for 45 minutes including:     Bridges with kegel x11  TA with march  TA with heel slide  Bent knee fall out  TA  Cat cow  Open books    Not today:  Biofeedback sEMG:  Supine Kegels 2w, 4r, 10 reps  Supine Kegels 5w, 4r, 10 reps  Kegels with cough  Seated Kegels 2w, 4r 10 reps  Seated Kegels 5w, 4r, 10 reps  Standing Kegels 2w, 4r, 10 reps  Diaphragmatic breathing   TA contractions x10 - cues for "blowing out the candle" to decrease breath holding  Posterior pelvic tilts with diaphragmatic breathing x15  SL hip abduction x15 ea.   Bridges with ball + TA contractions + DB  Supine hamstring stretch   Kegels with layers   - supine x10 " each layer  - seated   - supported standing       NATTY participated in dynamic functional therapeutic activities to improve functional performance for 0 minutes, including:    Reviewed HEP:  Paloff press green ThreaBand x15 ea,  SL hip abduction x15 ea.        Home Exercises Provided and Patient Education Provided     Education provided:   - bladder irritants, anatomy/physiology of pelvic floor, posture/body mechanices, bladder retraining, diaphragmatic breathing, double voiding techniques, isometric abdominal exercises, kegels, proper bearing down techniques, fluid intake/dietary modifications, and Coordination of kegels with functional activities such as cough, laugh, sneeze, lift, etc.   Discussed progression of plan of care with patient; educated pt in activity modification; reviewed HEP with pt. Pt demonstrated and verbalized understanding of all instruction and was provided with a handout of HEP (see Patient Instructions).    Written Home Exercises Provided: Patient instructed to cont prior HEP.  Exercises were reviewed and NATTY was able to demonstrate them prior to the end of the session.  NATTY demonstrated good  understanding of the education provided.     See EMR under Patient Instructions for exercises provided 12/12/2023 and prior visit.    Assessment     Patient worked on core strengthening to assist pelvic floor muscle contractions to decrease leakage. Patient reports leakage with bending over to pick objects off floor due to weakness in hips as well. She tends to have delayed TA contraction on right side compared to left. Patient will benefit from progressing with pelvic floor muscle strengthening.      NATTY Is progressing well towards her goals.   Pt prognosis is Good.     Pt will continue to benefit from skilled outpatient physical therapy to address the deficits listed in the problem list box on initial evaluation, provide pt/family education and to maximize pt's level of independence in the  home and community environment.     Pt's spiritual, cultural and educational needs considered and pt agreeable to plan of care and goals.     Anticipated barriers to physical therapy: none    Goals:  Short Term Goals: 6 weeks   - Pt will demonstrate excellent knowledge and adherence to HEP to facilitate optimal recovery. Progressing  - Pt will demonstrate proper PFM contraction, relaxation, and lengthening coordinated with TA and breath for improved muscle coordination needed for functional activity. Progressing  - Pt will demonstrate proper breathing technique to help with calming the nervous system in order to improve abdominal wall and PF musculature extensibility for decreased pain, improved voiding ability, and improved QOL. Progressing  - Pt to increase pelvic floor strength to at least 2/5 to demonstrate improved strength needed for continence with ADLs. Progressing  - Pt will report no incidence of urinary incontinence 4/7 days for improved hygiene and ADL/IADL tolerance. Progressing    Long Term Goals: 10 weeks   - Pt will demonstrate excellent knowledge and adherence to HEP for continued self-maintenance of symptoms. Progressing  - Pt will report PFDI Urianry FOTO score of 0% limited indicating clinically relevant increase in function. Progressing  - Pt will report voiding interval of 2-3 hours for improved ADL tolerance. Progressing  - Pt will report no incidence of urinary incontinence 6/7 days for improved hygiene and ADL/IADL tolerance. Progressing  - Pt will demonstrate PFM strength of at least 3/5 MMT for improved strength needed to maintain continence. Progressing  - Pt to be able to perform a 3 second kegel x 10 reps with good quality to demonstrate improving strength and endurance needed for continence.Progressing  - Pt will report bearing down appropriately 100% of the time for improved bowel function and decreased stress on adjacent pelvic structures. Progressing  - Pt will be able to  successfully complete sexual intercourse without pain for improved ADL tolerance. Progressing  - Pt will report ability to tolerate speculum exam without pain for improved access to healthcare. Progressing  - Pt to be educated in pelvic muscle bracing and be able to consistently perform correctly and quickly to help decrease incontinence with cough/laugh/sneeze. Progressing  - Pt will be able to correctly and consistently explain urge control strategies to demonstrate understanding of these strategies, decrease likelihood of leakage, and increase time between voids. Progressing  - Pt to report improved restorative sleeping, waking up no more than 0-1x/night due to urge to urinate. Progressing  - Pt will report ability to successfully perform double voiding for complete urinary emptying after initial void to prevent immediate return to bathroom. Progressing     PLAN      Plan of care Certification: 11/30/2023 to 02/08/2024.     Outpatient Physical Therapy 1 time(s) every 1-2 week(s) for 10 weeks to include the following interventions: Electrical Stimulation TENS/IFC, Manual Therapy, Moist Heat/ Ice, Neuromuscular Re-ed, Patient Education, Self Care, Therapeutic Activities, and Therapeutic Exercise, FDN, and ASTYM.     Maryan Quezada, PTA

## 2024-01-08 NOTE — PROGRESS NOTES
"  Pelvic Health Physical Therapy   Treatment Note     Name: Natty [place]  Clinic Number: [id number]    Therapy Diagnosis:   Encounter Diagnosis   Name Primary?    Pelvic floor dysfunction Yes           Physician: Keira Riggins MD    Visit Date: 1/9/2024    Physician Orders: PT Eval and Treat   Medical Diagnosis from Referral: Stress incontinence in female [N39.3], [place]-Walker grade 2 cystocele [N81.10]   Evaluation Date: 11/30/2023  Authorization Period Expiration: 12/01/2024  Plan of Care Expiration: 02/08/2024  Progress Note Due: 02/08/2024  Visit # / Visits authorized: 1/20 ; 2/20 + eval   FOTO: Issued Visit #: 1 /3      Precautions: Standard    Time In: 12:46  Time Out: 1:30  Total Billable Time: 44 minutes    Precautions: Standard    Subjective     Pt reports: continued urinary leakage with lifting, sneezing, and coughing. Patient reports only using 2 pads per day and some days she doesn't wear any protection.     She was compliant with home exercise program.  Response to previous treatment: increased leakage  Functional change: no change     Pain: 0/10   Location: generalized    Objective     Objective Measures updated at progress report unless specified.     ABDOMINAL WALL ASSESSMENT  Abdominal strength: Transverse abdominus: good to excellent   Diastasis: absent with curl-up test    Treatment     NATTY participated in neuromuscular re-education activities to develop Coordination, Control, Down training, Proprioception, and Sense for 34 minutes including:     Diaphragmatic breathing   TA contractions x10 - cues for "blowing out the candle" to decrease breath holding  Posterior pelvic tilts with diaphragmatic breathing + abdominal bracing  x15  Bridges with kegel x12  BKFO with TA contraction green ThreaBand   Bird dogs - slight leakage during. Last 4 performed with Kegel + exhale   Cat cow   Kegels with layers   - supine x10 each layer  - seated       Biofeedback sEMG:  Supine Kegels 2w, 4r, 10 " "reps  Supine Kegels 5w, 4r, 10 reps  Kegels with cough  Seated Kegels 2w, 4r 10 reps  Seated Kegels 5w, 4r, 10 reps  Standing Kegels 2w, 4r, 10 reps      Not today:  Diaphragmatic breathing   TA contractions x10 - cues for "blowing out the candle" to decrease breath holding  Posterior pelvic tilts with diaphragmatic breathing x15  SL hip abduction x15 ea.   Bridges with ball + TA contractions + DB  Supine hamstring stretch   Kegels with layers   - supine x10 each layer  - seated   - supported standing       NATTY participated in dynamic functional therapeutic activities to improve functional performance for 10 minutes, including:    Kegel with deadlift - cues for increasing hip hinge   Kegels with load transfer 10# box 5 small laps     Reviewed HEP:  Paloff press green ThreaBand x15 ea,  SL hip abduction x15 ea.        Home Exercises Provided and Patient Education Provided     Education provided:   - bladder irritants, anatomy/physiology of pelvic floor, posture/body mechanices, bladder retraining, diaphragmatic breathing, double voiding techniques, isometric abdominal exercises, kegels, proper bearing down techniques, fluid intake/dietary modifications, and Coordination of kegels with functional activities such as cough, laugh, sneeze, lift, etc.   Discussed progression of plan of care with patient; educated pt in activity modification; reviewed HEP with pt. Pt demonstrated and verbalized understanding of all instruction and was provided with a handout of HEP (see Patient Instructions).    Written Home Exercises Provided: Patient instructed to cont prior HEP.  Exercises were reviewed and NATTY was able to demonstrate them prior to the end of the session.  NATTY demonstrated good  understanding of the education provided.     See EMR under Patient Instructions for exercises provided 12/12/2023 and prior visit.    Assessment     Natty tolerated therapy session well with reports of continued urinary leakage with " lifting and cough/sneeze. Patient reports that she tends to leak with squatting and bending to lift moderate to heavy objects. Education on proper lifting techniques and working on neuro-reeducation to have pelvic floor contract with any functional movements. Patient able to perform proper deadlift with verbal and tactile cues. Will increase weight with load transfer next visit. Continue progressing in POC as tolerated.     NATTY Is progressing well towards her goals.   Pt prognosis is Good.     Pt will continue to benefit from skilled outpatient physical therapy to address the deficits listed in the problem list box on initial evaluation, provide pt/family education and to maximize pt's level of independence in the home and community environment.     Pt's spiritual, cultural and educational needs considered and pt agreeable to plan of care and goals.     Anticipated barriers to physical therapy: none    Goals:  Short Term Goals: 6 weeks   - Pt will demonstrate excellent knowledge and adherence to HEP to facilitate optimal recovery. Progressing  - Pt will demonstrate proper PFM contraction, relaxation, and lengthening coordinated with TA and breath for improved muscle coordination needed for functional activity. Progressing  - Pt will demonstrate proper breathing technique to help with calming the nervous system in order to improve abdominal wall and PF musculature extensibility for decreased pain, improved voiding ability, and improved QOL. Progressing  - Pt to increase pelvic floor strength to at least 2/5 to demonstrate improved strength needed for continence with ADLs. Progressing  - Pt will report no incidence of urinary incontinence 4/7 days for improved hygiene and ADL/IADL tolerance. Progressing    Long Term Goals: 10 weeks   - Pt will demonstrate excellent knowledge and adherence to HEP for continued self-maintenance of symptoms. Progressing  - Pt will report PFDI Urianry FOTO score of 0% limited  indicating clinically relevant increase in function. Progressing  - Pt will report voiding interval of 2-3 hours for improved ADL tolerance. Progressing  - Pt will report no incidence of urinary incontinence 6/7 days for improved hygiene and ADL/IADL tolerance. Progressing  - Pt will demonstrate PFM strength of at least 3/5 MMT for improved strength needed to maintain continence. Progressing  - Pt to be able to perform a 3 second kegel x 10 reps with good quality to demonstrate improving strength and endurance needed for continence.Progressing  - Pt will report bearing down appropriately 100% of the time for improved bowel function and decreased stress on adjacent pelvic structures. Progressing  - Pt will be able to successfully complete sexual intercourse without pain for improved ADL tolerance. Progressing  - Pt will report ability to tolerate speculum exam without pain for improved access to healthcare. Progressing  - Pt to be educated in pelvic muscle bracing and be able to consistently perform correctly and quickly to help decrease incontinence with cough/laugh/sneeze. Progressing  - Pt will be able to correctly and consistently explain urge control strategies to demonstrate understanding of these strategies, decrease likelihood of leakage, and increase time between voids. Progressing  - Pt to report improved restorative sleeping, waking up no more than 0-1x/night due to urge to urinate. Progressing  - Pt will report ability to successfully perform double voiding for complete urinary emptying after initial void to prevent immediate return to bathroom. Progressing     PLAN      Plan of care Certification: 11/30/2023 to 02/08/2024.     Outpatient Physical Therapy 1 time(s) every 1-2 week(s) for 10 weeks to include the following interventions: Electrical Stimulation TENS/IFC, Manual Therapy, Moist Heat/ Ice, Neuromuscular Re-ed, Patient Education, Self Care, Therapeutic Activities, and Therapeutic Exercise, FDN,  and ASTYM.     Ash Phoenix, PT

## 2024-01-09 ENCOUNTER — CLINICAL SUPPORT (OUTPATIENT)
Dept: REHABILITATION | Facility: HOSPITAL | Age: 49
End: 2024-01-09
Payer: COMMERCIAL

## 2024-01-09 DIAGNOSIS — M62.89 PELVIC FLOOR DYSFUNCTION: Primary | ICD-10-CM

## 2024-01-09 PROCEDURE — 97530 THERAPEUTIC ACTIVITIES: CPT | Mod: PN

## 2024-01-09 PROCEDURE — 97112 NEUROMUSCULAR REEDUCATION: CPT | Mod: PN

## 2024-01-22 NOTE — PROGRESS NOTES
"  Pelvic Health Physical Therapy   Treatment Note     Name: Natty [place]  Clinic Number: [id number]    Therapy Diagnosis:   Encounter Diagnosis   Name Primary?    Pelvic floor dysfunction Yes         Physician: Keira Riggins MD    Visit Date: 1/23/2024    Physician Orders: PT Eval and Treat   Medical Diagnosis from Referral: Stress incontinence in female [N39.3], [place]-Walker grade 2 cystocele [N81.10]   Evaluation Date: 11/30/2023  Authorization Period Expiration: 12/01/2024  Plan of Care Expiration: 02/08/2024 NEXT VISIT  Progress Note Due: 02/08/2024 NEXT VISIT  Visit # / Visits authorized: 2/20 ; 2/20 + eval   FOTO: Issued Visit #: 2 /3      Precautions: Standard    Time In: 10:35   Time Out: 11:18   Total Billable Time: 43 minutes    Precautions: Standard    Subjective     Pt reports: feeling about the same. Has her good and bad days with leakage.     She was compliant with home exercise program.  Response to previous treatment: no adverse symptoms   Functional change: no change     Pain: 0/10    Location: generalized    Objective     Objective Measures updated at progress report unless specified.       Treatment     NATTY participated in neuromuscular re-education activities to develop Coordination, Control, Down training, Proprioception, and Sense for 28 minutes including:     Diaphragmatic breathing   TA contractions x10 - cues for "blowing out the candle" to decrease breath holding  BKFO with TA contraction green ThreaBand   Paloff press 30# 2x10 ea.  Standing heel raises x20 with eccentric lowering     Not today:  Posterior pelvic tilts with diaphragmatic breathing + abdominal bracing  x15  Bridges with kegel x12  Bird dogs - slight leakage during. Last 4 performed with Kegel + exhale   Cat cow   Kegels with layers   - supine x10 each layer  - seated       Biofeedback sEMG:  Supine Kegels 2w, 4r, 10 reps  Supine Kegels 5w, 4r, 10 reps  Kegels with cough  Seated Kegels 2w, 4r 10 reps  Seated Kegels 5w, " 4r, 10 reps  Standing Kegels 2w, 4r, 10 reps      NATTY participated in dynamic functional therapeutic activities to improve functional performance for 15  minutes, including:    Kegels with load transfer box + 10# weight added 2 rounds of 3 laps  SLED pushes with Kegel hold       Reviewed HEP:  Paloff press green ThreaBand x15 ea,  SL hip abduction x15 ea.   Kegel with deadlift - cues for increasing hip hinge        Home Exercises Provided and Patient Education Provided     Education provided:   - bladder irritants, anatomy/physiology of pelvic floor, posture/body mechanices, bladder retraining, diaphragmatic breathing, double voiding techniques, isometric abdominal exercises, kegels, proper bearing down techniques, fluid intake/dietary modifications, and Coordination of kegels with functional activities such as cough, laugh, sneeze, lift, etc.   Discussed progression of plan of care with patient; educated pt in activity modification; reviewed HEP with pt. Pt demonstrated and verbalized understanding of all instruction and was provided with a handout of HEP (see Patient Instructions).    Written Home Exercises Provided: Patient instructed to cont prior HEP.  Exercises were reviewed and NATTY was able to demonstrate them prior to the end of the session.  NATTY demonstrated good  understanding of the education provided.     See EMR under Patient Instructions for exercises provided 12/12/2023 and prior visit.    Assessment     Natty presents to therapy today with reports of good and bad days with urinary incontinence. Focused on performing functional activities with pelvic floor contraction to improve neuromuscular re-education. Patient reporting ability to hold pelvic contraction through increased loads without major difficulty. Continuing to progress core strengthening.     NATTY Is progressing well towards her goals.   Pt prognosis is Good.     Pt will continue to benefit from skilled outpatient physical  therapy to address the deficits listed in the problem list box on initial evaluation, provide pt/family education and to maximize pt's level of independence in the home and community environment.     Pt's spiritual, cultural and educational needs considered and pt agreeable to plan of care and goals.     Anticipated barriers to physical therapy: none    Goals:  Short Term Goals: 6 weeks   - Pt will demonstrate excellent knowledge and adherence to HEP to facilitate optimal recovery. Progressing  - Pt will demonstrate proper PFM contraction, relaxation, and lengthening coordinated with TA and breath for improved muscle coordination needed for functional activity. Progressing  - Pt will demonstrate proper breathing technique to help with calming the nervous system in order to improve abdominal wall and PF musculature extensibility for decreased pain, improved voiding ability, and improved QOL. Progressing  - Pt to increase pelvic floor strength to at least 2/5 to demonstrate improved strength needed for continence with ADLs. Progressing  - Pt will report no incidence of urinary incontinence 4/7 days for improved hygiene and ADL/IADL tolerance. Progressing    Long Term Goals: 10 weeks   - Pt will demonstrate excellent knowledge and adherence to HEP for continued self-maintenance of symptoms. Progressing  - Pt will report PFDI Urianry FOTO score of 0% limited indicating clinically relevant increase in function. Progressing  - Pt will report voiding interval of 2-3 hours for improved ADL tolerance. Progressing  - Pt will report no incidence of urinary incontinence 6/7 days for improved hygiene and ADL/IADL tolerance. Progressing  - Pt will demonstrate PFM strength of at least 3/5 MMT for improved strength needed to maintain continence. Progressing  - Pt to be able to perform a 3 second kegel x 10 reps with good quality to demonstrate improving strength and endurance needed for continence.Progressing  - Pt will report  bearing down appropriately 100% of the time for improved bowel function and decreased stress on adjacent pelvic structures. Progressing  - Pt will be able to successfully complete sexual intercourse without pain for improved ADL tolerance. Progressing  - Pt will report ability to tolerate speculum exam without pain for improved access to healthcare. Progressing  - Pt to be educated in pelvic muscle bracing and be able to consistently perform correctly and quickly to help decrease incontinence with cough/laugh/sneeze. Progressing  - Pt will be able to correctly and consistently explain urge control strategies to demonstrate understanding of these strategies, decrease likelihood of leakage, and increase time between voids. Progressing  - Pt to report improved restorative sleeping, waking up no more than 0-1x/night due to urge to urinate. Progressing  - Pt will report ability to successfully perform double voiding for complete urinary emptying after initial void to prevent immediate return to bathroom. Progressing     PLAN      Plan of care Certification: 11/30/2023 to 02/08/2024.     Outpatient Physical Therapy 1 time(s) every 1-2 week(s) for 10 weeks to include the following interventions: Electrical Stimulation TENS/IFC, Manual Therapy, Moist Heat/ Ice, Neuromuscular Re-ed, Patient Education, Self Care, Therapeutic Activities, and Therapeutic Exercise, FDN, and ASTYM.     Ash Phoenix, PT

## 2024-01-23 ENCOUNTER — CLINICAL SUPPORT (OUTPATIENT)
Dept: REHABILITATION | Facility: HOSPITAL | Age: 49
End: 2024-01-23
Payer: COMMERCIAL

## 2024-01-23 DIAGNOSIS — M62.89 PELVIC FLOOR DYSFUNCTION: Primary | ICD-10-CM

## 2024-01-23 PROCEDURE — 97112 NEUROMUSCULAR REEDUCATION: CPT | Mod: PN

## 2024-01-23 PROCEDURE — 97530 THERAPEUTIC ACTIVITIES: CPT | Mod: PN

## 2024-02-05 ENCOUNTER — CLINICAL SUPPORT (OUTPATIENT)
Dept: REHABILITATION | Facility: HOSPITAL | Age: 49
End: 2024-02-05
Payer: COMMERCIAL

## 2024-02-05 ENCOUNTER — DOCUMENTATION ONLY (OUTPATIENT)
Dept: REHABILITATION | Facility: HOSPITAL | Age: 49
End: 2024-02-05

## 2024-02-05 DIAGNOSIS — M62.89 PELVIC FLOOR DYSFUNCTION: Primary | ICD-10-CM

## 2024-02-05 PROCEDURE — 97530 THERAPEUTIC ACTIVITIES: CPT | Mod: PN,CQ

## 2024-02-05 PROCEDURE — 97112 NEUROMUSCULAR REEDUCATION: CPT | Mod: PN,CQ

## 2024-02-05 NOTE — PROGRESS NOTES
PT/PTA met face to face to discuss pt's treatment plan and progress towards established goals. Pt will be seen by a physical therapist minimally every 6th visit or every 30 days.    Maryan Quezada PTA

## 2024-02-05 NOTE — PROGRESS NOTES
"  Pelvic Health Physical Therapy   Treatment Note     Name: Natty [place]  Clinic Number: [id number]    Therapy Diagnosis:   Encounter Diagnosis   Name Primary?    Pelvic floor dysfunction Yes       Physician: Keira Riggins MD    Visit Date: 2/5/2024    Physician Orders: PT Eval and Treat   Medical Diagnosis from Referral: Stress incontinence in female [N39.3], [place]-Walker grade 2 cystocele [N81.10]   Evaluation Date: 11/30/2023  Authorization Period Expiration: 12/01/2024  Plan of Care Expiration: 02/08/2024  Progress Note Due: 02/08/2024   Visit # / Visits authorized: 3/20 ; 2/20 + eval   FOTO: Issued Visit #: 2 /3      Precautions: Standard    Time In: 9:50   Time Out: 10:30   Total Billable Time: 40 minutes    Precautions: Standard    Subjective     Pt reports: she is still having leakage and just had a cold recently. She is going on a cruise next week and seeing urologist when she comes back. She went to her son's game and had increased leakage with jumping to cheer him on. She is seeing chiropractor for low back pain as well.    She was compliant with home exercise program.  Response to previous treatment: no adverse symptoms   Functional change: less frequency of leakage (generally quarter size with stress)    Pain: 5/10    Location: low back    Objective     Objective Measures updated at progress report unless specified.       Treatment     NATTY participated in neuromuscular re-education activities to develop Coordination, Control, Down training, Proprioception, and Sense for 15 minutes including:     Diaphragmatic breathing x15 breaths  TA contractions x14 until fatigued - cues for "blowing out the candle" to decrease breath holding  + pelvic floor muscle contraction x10    Not today:  BKFO with TA contraction green ThreaBand   Paloff press 30# 2x10 ea.  Standing heel raises x20 with eccentric lowering   Posterior pelvic tilts with diaphragmatic breathing + abdominal bracing  x15  Bird dogs - slight " leakage during. Last 4 performed with Kegel + exhale   Cat cow   Kegels with layers   - supine x10 each layer  - seated       Biofeedback sEMG:  Supine Kegels 2w, 4r, 10 reps  Supine Kegels 5w, 4r, 10 reps  Kegels with cough  Seated Kegels 2w, 4r 10 reps  Seated Kegels 5w, 4r, 10 reps  Standing Kegels 2w, 4r, 10 reps      NATTY participated in dynamic functional therapeutic activities to improve functional performance for 30 minutes, including:    Bridges with kegel + TA co contraction x10  Sit to stand with PFM + TA contraction x8  Dead lift 10# KB x5  Kegels with load transfer box + 10# ball added  SLED pushes with Kegel hold x 1 lap       Home Exercises Provided and Patient Education Provided     Education provided:   - bladder irritants, anatomy/physiology of pelvic floor, posture/body mechanices, bladder retraining, diaphragmatic breathing, double voiding techniques, isometric abdominal exercises, kegels, proper bearing down techniques, fluid intake/dietary modifications, and Coordination of kegels with functional activities such as cough, laugh, sneeze, lift, etc.   Discussed progression of plan of care with patient; educated pt in activity modification; reviewed HEP with pt. Pt demonstrated and verbalized understanding of all instruction and was provided with a handout of HEP (see Patient Instructions).    Written Home Exercises Provided: Patient instructed to cont prior HEP.  Exercises were reviewed and NATTY was able to demonstrate them prior to the end of the session.  NATTY demonstrated good  understanding of the education provided.     See EMR under Patient Instructions for exercises provided 12/12/2023 and prior visit.    Assessment     Natty presents to therapy today with reports urinary incontinence although frequency of leakage has resolved, she does still experience stress incontinence. Progressed treatment with more functional pelvic floor and TA co contraction to decrease leakage with  functional activities including lifting and squatting to improve neuromuscular re-education. Most difficulty noted with squatting and hip hinging while holding PFM and Ta contraction    NATTY Is progressing well towards her goals.   Pt prognosis is Good.     Pt will continue to benefit from skilled outpatient physical therapy to address the deficits listed in the problem list box on initial evaluation, provide pt/family education and to maximize pt's level of independence in the home and community environment.     Pt's spiritual, cultural and educational needs considered and pt agreeable to plan of care and goals.     Anticipated barriers to physical therapy: none    Goals:  Short Term Goals: 6 weeks   - Pt will demonstrate excellent knowledge and adherence to HEP to facilitate optimal recovery. Progressing  - Pt will demonstrate proper PFM contraction, relaxation, and lengthening coordinated with TA and breath for improved muscle coordination needed for functional activity. Progressing  - Pt will demonstrate proper breathing technique to help with calming the nervous system in order to improve abdominal wall and PF musculature extensibility for decreased pain, improved voiding ability, and improved QOL. Progressing  - Pt to increase pelvic floor strength to at least 2/5 to demonstrate improved strength needed for continence with ADLs. Progressing  - Pt will report no incidence of urinary incontinence 4/7 days for improved hygiene and ADL/IADL tolerance. Progressing    Long Term Goals: 10 weeks   - Pt will demonstrate excellent knowledge and adherence to HEP for continued self-maintenance of symptoms. Progressing  - Pt will report PFDI Urianry FOTO score of 0% limited indicating clinically relevant increase in function. Progressing  - Pt will report voiding interval of 2-3 hours for improved ADL tolerance. Progressing  - Pt will report no incidence of urinary incontinence 6/7 days for improved hygiene and ADL/IADL  tolerance. Progressing  - Pt will demonstrate PFM strength of at least 3/5 MMT for improved strength needed to maintain continence. Progressing  - Pt to be able to perform a 3 second kegel x 10 reps with good quality to demonstrate improving strength and endurance needed for continence.Progressing  - Pt will report bearing down appropriately 100% of the time for improved bowel function and decreased stress on adjacent pelvic structures. Progressing  - Pt will be able to successfully complete sexual intercourse without pain for improved ADL tolerance. Progressing  - Pt will report ability to tolerate speculum exam without pain for improved access to healthcare. Progressing  - Pt to be educated in pelvic muscle bracing and be able to consistently perform correctly and quickly to help decrease incontinence with cough/laugh/sneeze. Progressing  - Pt will be able to correctly and consistently explain urge control strategies to demonstrate understanding of these strategies, decrease likelihood of leakage, and increase time between voids. Progressing  - Pt to report improved restorative sleeping, waking up no more than 0-1x/night due to urge to urinate. Progressing  - Pt will report ability to successfully perform double voiding for complete urinary emptying after initial void to prevent immediate return to bathroom. Progressing     PLAN      Plan of care Certification: 11/30/2023 to 02/08/2024.     Outpatient Physical Therapy 1 time(s) every 1-2 week(s) for 10 weeks to include the following interventions: Electrical Stimulation TENS/IFC, Manual Therapy, Moist Heat/ Ice, Neuromuscular Re-ed, Patient Education, Self Care, Therapeutic Activities, and Therapeutic Exercise, FDN, and ASTYM.     Maryan Quezada, PTA

## 2024-02-19 ENCOUNTER — OFFICE VISIT (OUTPATIENT)
Dept: UROLOGY | Facility: CLINIC | Age: 49
End: 2024-02-19
Payer: COMMERCIAL

## 2024-02-19 VITALS
BODY MASS INDEX: 29.02 KG/M2 | HEIGHT: 66 IN | SYSTOLIC BLOOD PRESSURE: 120 MMHG | HEART RATE: 75 BPM | RESPIRATION RATE: 17 BRPM | DIASTOLIC BLOOD PRESSURE: 74 MMHG | WEIGHT: 180.56 LBS

## 2024-02-19 DIAGNOSIS — Z01.818 PRE-OP TESTING: ICD-10-CM

## 2024-02-19 DIAGNOSIS — N39.3 STRESS INCONTINENCE IN FEMALE: Primary | ICD-10-CM

## 2024-02-19 DIAGNOSIS — N81.10 BADEN-WALKER GRADE 2 CYSTOCELE: ICD-10-CM

## 2024-02-19 DIAGNOSIS — N39.46 MIXED INCONTINENCE: ICD-10-CM

## 2024-02-19 DIAGNOSIS — Z01.818 PRE-OP EVALUATION: ICD-10-CM

## 2024-02-19 LAB — POC RESIDUAL URINE VOLUME: 24 ML (ref 0–100)

## 2024-02-19 PROCEDURE — 99999 PR PBB SHADOW E&M-EST. PATIENT-LVL V: CPT | Mod: PBBFAC,,, | Performed by: UROLOGY

## 2024-02-19 PROCEDURE — 3008F BODY MASS INDEX DOCD: CPT | Mod: CPTII,S$GLB,, | Performed by: UROLOGY

## 2024-02-19 PROCEDURE — 3074F SYST BP LT 130 MM HG: CPT | Mod: CPTII,S$GLB,, | Performed by: UROLOGY

## 2024-02-19 PROCEDURE — 3078F DIAST BP <80 MM HG: CPT | Mod: CPTII,S$GLB,, | Performed by: UROLOGY

## 2024-02-19 PROCEDURE — 1159F MED LIST DOCD IN RCRD: CPT | Mod: CPTII,S$GLB,, | Performed by: UROLOGY

## 2024-02-19 PROCEDURE — 99214 OFFICE O/P EST MOD 30 MIN: CPT | Mod: S$GLB,,, | Performed by: UROLOGY

## 2024-02-19 PROCEDURE — 51798 US URINE CAPACITY MEASURE: CPT | Mod: S$GLB,,, | Performed by: UROLOGY

## 2024-02-19 RX ORDER — CIPROFLOXACIN 2 MG/ML
400 INJECTION, SOLUTION INTRAVENOUS
Status: CANCELLED | OUTPATIENT
Start: 2024-02-19

## 2024-02-19 RX ORDER — LIDOCAINE HYDROCHLORIDE 20 MG/ML
JELLY TOPICAL ONCE
Status: CANCELLED | OUTPATIENT
Start: 2024-02-19 | End: 2024-02-19

## 2024-02-19 NOTE — H&P (VIEW-ONLY)
Chief Complaint   Patient presents with    Follow-up     3 month       History of Present Illness:   Arlyn Vaughan is a 48 y.o. female here for evaluation of Follow-up (3 month)    2/19/24-Pt attended pelvic floor PT and is improved, but still having JOSE at times. Wears pads only if she knows she is going to be doing activity. A little UUI. No dysuria/gross hematuria.  11/20/23-47yo female, here for evaluation of urinary incontinence. Pt reports that for the past couple of years, she ahs been having JOSE with sneeze, laugh. Progressively getting worse. Wears a liner, especially when she goes to the gym. She can even leak with jumping, lifting. She reports incontinence even with intercourse. In October, she had her first diagnosed UTI. Symptoms were painful and went on for a week, so she went to urgent care.   She saw Dr. Hunt, who prescribed vesicare, which didn't help with the incontinence, but caused an intermittent urinary stream. She has since stopped it.   She does report some urgency, which may occur as she gets close to the restroom. Sometimes she has UUI on the way.   She struggles with constipation. She was recently diagnosed with IBS. Taking probiotics and drinking Plexus.       Review of Systems   Constitutional:  Positive for fatigue.   Respiratory:  Negative for shortness of breath.    Cardiovascular:  Negative for chest pain.   Gastrointestinal:  Negative for abdominal pain.   Musculoskeletal:  Positive for arthralgias.   All other systems reviewed and are negative.        Past Medical History:   Diagnosis Date    Anemia     Breast cyst     Fever blister     Graves disease     Hypothyroidism     Lichen sclerosus     Urinary incontinence 2021    It is getting worse.       Past Surgical History:   Procedure Laterality Date    COLONOSCOPY N/A 02/02/2022    Procedure: COLONOSCOPY;  Surgeon: Fransico Miles III, MD;  Location: [facility];  Service: Endoscopy;  Laterality: N/A;    SALPINGOOPHORECTOMY Left 2008     SHOULDER SURGERY Right        Family History   Problem Relation Age of Onset    Diabetes type II Father     Migraines Father     Heart disease Mother     Gallbladder disease Mother     Fibromyalgia Mother     Arrhythmia Mother     Breast cancer Mother     Osteoarthritis Mother     Rashes / Skin problems Mother     Thyroid disease Mother     Gallbladder disease Sister     Rashes / Skin problems Maternal Grandfather     Diabetes Paternal Grandmother        Social History     Tobacco Use    Smoking status: Never    Smokeless tobacco: Never   Substance Use Topics    Alcohol use: Never    Drug use: Never       Current Outpatient Medications   Medication Sig Dispense Refill    ascorbic acid, vitamin C, (VITAMIN C) 1000 MG tablet Take 1,000 mg by mouth.      biotin 10,000 mcg Cap Take by mouth.      busPIRone (BUSPAR) 5 MG Tab Take one tablet once a day      calcium carbonate/vitamin D3 (VITAMIN D-3 ORAL)       cetirizine (ZYRTEC) 10 MG tablet Take 10 mg by mouth once daily.      clobetasoL (TEMOVATE) 0.05 % cream Apply topically 2 (two) times daily. for 14 days 45 g 3    doxycycline (MONODOX) 50 MG Cap       estradiol 0.05 mg/24 hr td ptsw (VIVELLE-DOT) 0.05 mg/24 hr Place 1 patch onto the skin twice a week. 8 patch 11    ferrous sulfate (FEOSOL) 325 mg (65 mg iron) Tab tablet Take 325 mg by mouth.      hydrocortisone 2.5 % cream Apply topically.      levonorgestreL (MIRENA) 20 mcg/24 hours (7 yrs) 52 mg IUD 1 each by Intrauterine route once.      minoxidiL (LONITEN) 2.5 MG tablet       montelukast (SINGULAIR) 10 mg tablet Take 10 mg by mouth every evening.      multivitamin (THERAGRAN) per tablet Take 1 tablet by mouth once daily.      NP THYROID 120 mg Tab       NP THYROID 15 mg Tab       phentermine (ADIPEX-P) 37.5 mg tablet Take 37.5 mg by mouth before breakfast.      spironolactone (ALDACTONE) 100 MG tablet Take 1 tablet by mouth once daily.      VITAMIN B COMPLEX ORAL Take 1 tablet by mouth once daily.      ZINC  ORAL Take 100 mg by mouth.       No current facility-administered medications for this visit.       Review of patient's allergies indicates:  No Known Allergies    Physical Exam  Vitals:    02/19/24 0917   BP: 120/74   Pulse: 75   Resp: 17     General: Well-developed, well-nourished, in no acute distress  HEENT: Normocephalic, atraumatic, extraocular movements intact  Neck: Supple, no supraclavicular or cervical lymphadenopathy, trachea midline  Respirations: even and unlabored  Back: midline spine, No CVA tenderness  Abdomen: soft, Non-tender, non-distended, no palpable masses, no rebound or guarding  : 11/20/23-Normal external female genitalia without lesions. Orthotopic urethral meatus. Fairly healthy vaginal mucosa. Grade 2 Cysotcele, + urethral hypermobility  Extremities: moves all equally, no clubbing, cyanosis or edema  Skin: Warm and dry. No lesions  Psych: normal affect  Neuro: Alert and oriented x 3. Cranial nerves II-XII intact    PVR: 24cc    Assessment:  1. Stress incontinence in female  POCT Bladder Scan    Place in Outpatient    Case Request Operating Room: CYSTOSCOPY, WITH PERIURETHRAL BULKING AGENT INJECTION    Diet NPO    Place sequential compression device    Ambulatory referral/consult to Pre-Admit      2. Pre-op testing  CBC Auto Differential    Comprehensive Metabolic Panel    X-Ray Chest PA And Lateral    EKG 12-lead      3. Pre-op evaluation  Ambulatory referral/consult to Pre-Admit      4. [place]-Walker grade 2 cystocele        5. Mixed incontinence              Plan:   Stress incontinence in female  -     POCT Bladder Scan  -     Place in Outpatient; Standing  -     Case Request Operating Room: CYSTOSCOPY, WITH PERIURETHRAL BULKING AGENT INJECTION  -     Diet NPO; Standing  -     Place sequential compression device; Standing  -     Ambulatory referral/consult to Pre-Admit; Future; Expected date: 02/26/2024    Pre-op testing  -     CBC Auto Differential; Future; Expected date: 02/19/2024  -      Comprehensive Metabolic Panel; Future; Expected date: 02/19/2024  -     X-Ray Chest PA And Lateral; Future; Expected date: 02/19/2024  -     EKG 12-lead; Future    Pre-op evaluation  -     Ambulatory referral/consult to Pre-Admit; Future; Expected date: 02/26/2024    [place]-Walker grade 2 cystocele    Mixed incontinence    Other orders  -     IP VTE LOW RISK PATIENT; Standing  -     ciprofloxacin (CIPRO)400mg/200ml D5W IVPB 400 mg  -     LIDOcaine HCl 2% urojet    Discussed management options, including continued PFPT, sling surgery +/- anterior repair, and urethral bulking injections. We discussed the risks/benefits of each. We discussed perioperative limitations and expectations. She would like to continue with PFPT and also do Bulkamid injections. Will schedule for 3/11/24.

## 2024-02-19 NOTE — PROGRESS NOTES
Chief Complaint   Patient presents with    Follow-up     3 month       History of Present Illness:   Arlyn Vaughan is a 48 y.o. female here for evaluation of Follow-up (3 month)    2/19/24-Pt attended pelvic floor PT and is improved, but still having JOSE at times. Wears pads only if she knows she is going to be doing activity. A little UUI. No dysuria/gross hematuria.  11/20/23-47yo female, here for evaluation of urinary incontinence. Pt reports that for the past couple of years, she ahs been having JOSE with sneeze, laugh. Progressively getting worse. Wears a liner, especially when she goes to the gym. She can even leak with jumping, lifting. She reports incontinence even with intercourse. In October, she had her first diagnosed UTI. Symptoms were painful and went on for a week, so she went to urgent care.   She saw Dr. Hunt, who prescribed vesicare, which didn't help with the incontinence, but caused an intermittent urinary stream. She has since stopped it.   She does report some urgency, which may occur as she gets close to the restroom. Sometimes she has UUI on the way.   She struggles with constipation. She was recently diagnosed with IBS. Taking probiotics and drinking Plexus.       Review of Systems   Constitutional:  Positive for fatigue.   Respiratory:  Negative for shortness of breath.    Cardiovascular:  Negative for chest pain.   Gastrointestinal:  Negative for abdominal pain.   Musculoskeletal:  Positive for arthralgias.   All other systems reviewed and are negative.        Past Medical History:   Diagnosis Date    Anemia     Breast cyst     Fever blister     Graves disease     Hypothyroidism     Lichen sclerosus     Urinary incontinence 2021    It is getting worse.       Past Surgical History:   Procedure Laterality Date    COLONOSCOPY N/A 02/02/2022    Procedure: COLONOSCOPY;  Surgeon: Fransico Miles III, MD;  Location: [facility];  Service: Endoscopy;  Laterality: N/A;    SALPINGOOPHORECTOMY Left 2008     SHOULDER SURGERY Right        Family History   Problem Relation Age of Onset    Diabetes type II Father     Migraines Father     Heart disease Mother     Gallbladder disease Mother     Fibromyalgia Mother     Arrhythmia Mother     Breast cancer Mother     Osteoarthritis Mother     Rashes / Skin problems Mother     Thyroid disease Mother     Gallbladder disease Sister     Rashes / Skin problems Maternal Grandfather     Diabetes Paternal Grandmother        Social History     Tobacco Use    Smoking status: Never    Smokeless tobacco: Never   Substance Use Topics    Alcohol use: Never    Drug use: Never       Current Outpatient Medications   Medication Sig Dispense Refill    ascorbic acid, vitamin C, (VITAMIN C) 1000 MG tablet Take 1,000 mg by mouth.      biotin 10,000 mcg Cap Take by mouth.      busPIRone (BUSPAR) 5 MG Tab Take one tablet once a day      calcium carbonate/vitamin D3 (VITAMIN D-3 ORAL)       cetirizine (ZYRTEC) 10 MG tablet Take 10 mg by mouth once daily.      clobetasoL (TEMOVATE) 0.05 % cream Apply topically 2 (two) times daily. for 14 days 45 g 3    doxycycline (MONODOX) 50 MG Cap       estradiol 0.05 mg/24 hr td ptsw (VIVELLE-DOT) 0.05 mg/24 hr Place 1 patch onto the skin twice a week. 8 patch 11    ferrous sulfate (FEOSOL) 325 mg (65 mg iron) Tab tablet Take 325 mg by mouth.      hydrocortisone 2.5 % cream Apply topically.      levonorgestreL (MIRENA) 20 mcg/24 hours (7 yrs) 52 mg IUD 1 each by Intrauterine route once.      minoxidiL (LONITEN) 2.5 MG tablet       montelukast (SINGULAIR) 10 mg tablet Take 10 mg by mouth every evening.      multivitamin (THERAGRAN) per tablet Take 1 tablet by mouth once daily.      NP THYROID 120 mg Tab       NP THYROID 15 mg Tab       phentermine (ADIPEX-P) 37.5 mg tablet Take 37.5 mg by mouth before breakfast.      spironolactone (ALDACTONE) 100 MG tablet Take 1 tablet by mouth once daily.      VITAMIN B COMPLEX ORAL Take 1 tablet by mouth once daily.      ZINC  ORAL Take 100 mg by mouth.       No current facility-administered medications for this visit.       Review of patient's allergies indicates:  No Known Allergies    Physical Exam  Vitals:    02/19/24 0917   BP: 120/74   Pulse: 75   Resp: 17     General: Well-developed, well-nourished, in no acute distress  HEENT: Normocephalic, atraumatic, extraocular movements intact  Neck: Supple, no supraclavicular or cervical lymphadenopathy, trachea midline  Respirations: even and unlabored  Back: midline spine, No CVA tenderness  Abdomen: soft, Non-tender, non-distended, no palpable masses, no rebound or guarding  : 11/20/23-Normal external female genitalia without lesions. Orthotopic urethral meatus. Fairly healthy vaginal mucosa. Grade 2 Cysotcele, + urethral hypermobility  Extremities: moves all equally, no clubbing, cyanosis or edema  Skin: Warm and dry. No lesions  Psych: normal affect  Neuro: Alert and oriented x 3. Cranial nerves II-XII intact    PVR: 24cc    Assessment:  1. Stress incontinence in female  POCT Bladder Scan    Place in Outpatient    Case Request Operating Room: CYSTOSCOPY, WITH PERIURETHRAL BULKING AGENT INJECTION    Diet NPO    Place sequential compression device    Ambulatory referral/consult to Pre-Admit      2. Pre-op testing  CBC Auto Differential    Comprehensive Metabolic Panel    X-Ray Chest PA And Lateral    EKG 12-lead      3. Pre-op evaluation  Ambulatory referral/consult to Pre-Admit      4. [place]-Walker grade 2 cystocele        5. Mixed incontinence              Plan:   Stress incontinence in female  -     POCT Bladder Scan  -     Place in Outpatient; Standing  -     Case Request Operating Room: CYSTOSCOPY, WITH PERIURETHRAL BULKING AGENT INJECTION  -     Diet NPO; Standing  -     Place sequential compression device; Standing  -     Ambulatory referral/consult to Pre-Admit; Future; Expected date: 02/26/2024    Pre-op testing  -     CBC Auto Differential; Future; Expected date: 02/19/2024  -      Comprehensive Metabolic Panel; Future; Expected date: 02/19/2024  -     X-Ray Chest PA And Lateral; Future; Expected date: 02/19/2024  -     EKG 12-lead; Future    Pre-op evaluation  -     Ambulatory referral/consult to Pre-Admit; Future; Expected date: 02/26/2024    [place]-Walker grade 2 cystocele    Mixed incontinence    Other orders  -     IP VTE LOW RISK PATIENT; Standing  -     ciprofloxacin (CIPRO)400mg/200ml D5W IVPB 400 mg  -     LIDOcaine HCl 2% urojet    Discussed management options, including continued PFPT, sling surgery +/- anterior repair, and urethral bulking injections. We discussed the risks/benefits of each. We discussed perioperative limitations and expectations. She would like to continue with PFPT and also do Bulkamid injections. Will schedule for 3/11/24.

## 2024-02-20 ENCOUNTER — TELEPHONE (OUTPATIENT)
Dept: PREADMISSION TESTING | Facility: HOSPITAL | Age: 49
End: 2024-02-20
Payer: COMMERCIAL

## 2024-02-20 NOTE — TELEPHONE ENCOUNTER
Spoke with patient regarding adipex prescription. Pt stated that she was prescribed adipex for 90 days and is no longer taking the medication. Pt stated that last dose was over 2 weeks ago.

## 2024-02-27 ENCOUNTER — TELEPHONE (OUTPATIENT)
Dept: PREADMISSION TESTING | Facility: HOSPITAL | Age: 49
End: 2024-02-27
Payer: COMMERCIAL

## 2024-02-27 NOTE — TELEPHONE ENCOUNTER
Pt is scheduled for surgery with Dr. Riggins on 03/11/24. Pt stated that she was seen by PCP on 02/20/24 and had blood work, EKG, and testing done with Dr. Ceferino Nugent. Sent fax to Dr. Nugent's office to request last office note, lab work, EKG, and other testing to be sent over to [phone]. Pt also stated that she had MRI completed at [facility]. Sent fax over to [facility] to request MRI results.

## 2024-02-29 ENCOUNTER — TELEPHONE (OUTPATIENT)
Dept: PREADMISSION TESTING | Facility: HOSPITAL | Age: 49
End: 2024-02-29
Payer: COMMERCIAL

## 2024-02-29 NOTE — TELEPHONE ENCOUNTER
Called Dr. Nugent's office ( [phone]) to fax over latest EKG and A1c results to [phone]. As of PCP visit from 2/20, A1c and EKG were pending. Staff was informed that fax request was sent 02/27.

## 2024-03-04 ENCOUNTER — TELEPHONE (OUTPATIENT)
Dept: UROLOGY | Facility: CLINIC | Age: 49
End: 2024-03-04
Payer: COMMERCIAL

## 2024-03-04 ENCOUNTER — PATIENT MESSAGE (OUTPATIENT)
Dept: PREADMISSION TESTING | Facility: HOSPITAL | Age: 49
End: 2024-03-04
Payer: COMMERCIAL

## 2024-03-04 RX ORDER — CHOLECALCIFEROL (VITAMIN D3) 25 MCG
1 TABLET ORAL EVERY MORNING
COMMUNITY

## 2024-03-04 RX ORDER — MELOXICAM 7.5 MG/1
7.5 TABLET ORAL EVERY MORNING
COMMUNITY
Start: 2024-02-20

## 2024-03-05 NOTE — TELEPHONE ENCOUNTER
Patient notified that Dr. Riggins has reviewed MRI of the spine that was done on last month and to also hold off taking medication Mounjaro this week due to surgery. Patient verbalized understanding.

## 2024-03-08 ENCOUNTER — ANESTHESIA EVENT (OUTPATIENT)
Dept: SURGERY | Facility: HOSPITAL | Age: 49
End: 2024-03-08
Payer: COMMERCIAL

## 2024-03-08 ENCOUNTER — TELEPHONE (OUTPATIENT)
Dept: PREADMISSION TESTING | Facility: HOSPITAL | Age: 49
End: 2024-03-08
Payer: COMMERCIAL

## 2024-03-08 NOTE — ANESTHESIA PREPROCEDURE EVALUATION
03/08/2024  Arlyn Vaughan is a 48 y.o., female.  Past Medical History:   Diagnosis Date    Anemia     Arthritis     Breast cyst     Fever blister     Graves disease     Hypothyroidism     Lichen sclerosus     Urinary incontinence 2021    It is getting worse.     Past Surgical History:   Procedure Laterality Date    COLONOSCOPY N/A 02/02/2022    Procedure: COLONOSCOPY;  Surgeon: Fransico Miles III, MD;  Location: [facility];  Service: Endoscopy;  Laterality: N/A;    SALPINGOOPHORECTOMY Left 2008    SHOULDER SURGERY Right          Pre-op Assessment    I have reviewed the Patient Summary Reports.     I have reviewed the Nursing Notes. I have reviewed the NPO Status.   I have reviewed the Medications.     Review of Systems  Anesthesia Hx:  No problems with previous Anesthesia   History of prior surgery of interest to airway management or planning:  Previous anesthesia: General        Denies Family Hx of Anesthesia complications.    Denies Personal Hx of Anesthesia complications.                    Social:  Non-Smoker       Hematology/Oncology:  Hematology Normal                                     Cardiovascular:  Cardiovascular Normal                                            Pulmonary:  Pulmonary Normal                       Renal/:  Renal/ Normal                 Hepatic/GI:  Hepatic/GI Normal                 Musculoskeletal:  Arthritis          Spine Disorders: lumbar            Neurological:  Neurology Normal                                      Endocrine:   Hypothyroidism  Pre-diabetes.        Psych:  Psychiatric History  depression                Physical Exam  General: Well nourished    Airway:  Mallampati: II   Mouth Opening: Normal  TM Distance: Normal  Tongue: Normal  Neck ROM: Normal ROM    Dental:  Intact        Anesthesia Plan  Type of Anesthesia, risks & benefits discussed:    Anesthesia Type:  Gen Natural Airway, Gen Supraglottic Airway  Intra-op Monitoring Plan: Standard ASA Monitors  Post Op Pain Control Plan: multimodal analgesia and IV/PO Opioids PRN  Induction:  IV  Informed Consent: Informed consent signed with the Patient and all parties understand the risks and agree with anesthesia plan.  All questions answered.   ASA Score: 2  Day of Surgery Review of History & Physical: H&P Update referred to the surgeon/provider.    Ready For Surgery From Anesthesia Perspective.     .

## 2024-03-11 ENCOUNTER — HOSPITAL ENCOUNTER (OUTPATIENT)
Facility: HOSPITAL | Age: 49
Discharge: HOME | End: 2024-03-11
Attending: UROLOGY | Admitting: UROLOGY
Payer: COMMERCIAL

## 2024-03-11 ENCOUNTER — ANESTHESIA (OUTPATIENT)
Dept: SURGERY | Facility: HOSPITAL | Age: 49
End: 2024-03-11
Payer: COMMERCIAL

## 2024-03-11 DIAGNOSIS — N39.3 STRESS INCONTINENCE IN FEMALE: Primary | ICD-10-CM

## 2024-03-11 DIAGNOSIS — M62.89 PELVIC FLOOR DYSFUNCTION: ICD-10-CM

## 2024-03-11 LAB
B-HCG UR QL: NEGATIVE
BILIRUB UR QL STRIP: NEGATIVE
CLARITY UR: CLEAR
COLOR UR: YELLOW
CTP QC/QA: YES
GLUCOSE UR QL STRIP: NEGATIVE
HGB UR QL STRIP: NEGATIVE
KETONES UR QL STRIP: NEGATIVE
LEUKOCYTE ESTERASE UR QL STRIP: NEGATIVE
NITRITE UR QL STRIP: NEGATIVE
PH UR STRIP: 7 [PH] (ref 5–8)
PROT UR QL STRIP: NEGATIVE
SP GR UR STRIP: 1.01 (ref 1–1.03)
URN SPEC COLLECT METH UR: NORMAL

## 2024-03-11 PROCEDURE — 37000008 HC ANESTHESIA 1ST 15 MINUTES: Performed by: UROLOGY

## 2024-03-11 PROCEDURE — 37000009 HC ANESTHESIA EA ADD 15 MINS: Performed by: UROLOGY

## 2024-03-11 PROCEDURE — 36000707: Performed by: UROLOGY

## 2024-03-11 PROCEDURE — 51715 ENDOSCOPIC INJECTION/IMPLANT: CPT | Mod: ,,, | Performed by: UROLOGY

## 2024-03-11 PROCEDURE — 63600175 PHARM REV CODE 636 W HCPCS: Performed by: ANESTHESIOLOGY

## 2024-03-11 PROCEDURE — 81003 URINALYSIS AUTO W/O SCOPE: CPT | Performed by: UROLOGY

## 2024-03-11 PROCEDURE — 25000003 PHARM REV CODE 250: Performed by: UROLOGY

## 2024-03-11 PROCEDURE — 25000003 PHARM REV CODE 250: Performed by: ANESTHESIOLOGY

## 2024-03-11 PROCEDURE — 27200651 HC AIRWAY, LMA: Performed by: ANESTHESIOLOGY

## 2024-03-11 PROCEDURE — 81025 URINE PREGNANCY TEST: CPT | Performed by: UROLOGY

## 2024-03-11 PROCEDURE — D9220A PRA ANESTHESIA: Mod: ,,, | Performed by: NURSE ANESTHETIST, CERTIFIED REGISTERED

## 2024-03-11 PROCEDURE — 63600175 PHARM REV CODE 636 W HCPCS: Performed by: UROLOGY

## 2024-03-11 PROCEDURE — 71000015 HC POSTOP RECOV 1ST HR: Performed by: UROLOGY

## 2024-03-11 PROCEDURE — L8606 SYNTHETIC IMPLNT URINARY 1ML: HCPCS | Performed by: UROLOGY

## 2024-03-11 PROCEDURE — 36000706: Performed by: UROLOGY

## 2024-03-11 PROCEDURE — 63600175 PHARM REV CODE 636 W HCPCS: Performed by: NURSE ANESTHETIST, CERTIFIED REGISTERED

## 2024-03-11 PROCEDURE — 25000003 PHARM REV CODE 250: Performed by: NURSE ANESTHETIST, CERTIFIED REGISTERED

## 2024-03-11 PROCEDURE — 71000033 HC RECOVERY, INTIAL HOUR: Performed by: UROLOGY

## 2024-03-11 DEVICE — SYS BULKAMID URETH BULKING 2ML: Type: IMPLANTABLE DEVICE | Site: URETHRA | Status: FUNCTIONAL

## 2024-03-11 RX ORDER — LIDOCAINE HYDROCHLORIDE 20 MG/ML
INJECTION, SOLUTION EPIDURAL; INFILTRATION; INTRACAUDAL; PERINEURAL
Status: DISCONTINUED | OUTPATIENT
Start: 2024-03-11 | End: 2024-03-11

## 2024-03-11 RX ORDER — FENTANYL CITRATE 50 UG/ML
INJECTION, SOLUTION INTRAMUSCULAR; INTRAVENOUS
Status: DISCONTINUED | OUTPATIENT
Start: 2024-03-11 | End: 2024-03-11

## 2024-03-11 RX ORDER — DEXMEDETOMIDINE HYDROCHLORIDE 100 UG/ML
INJECTION, SOLUTION INTRAVENOUS
Status: DISCONTINUED | OUTPATIENT
Start: 2024-03-11 | End: 2024-03-11

## 2024-03-11 RX ORDER — MEPERIDINE HYDROCHLORIDE 25 MG/ML
12.5 INJECTION INTRAMUSCULAR; INTRAVENOUS; SUBCUTANEOUS ONCE AS NEEDED
Status: DISCONTINUED | OUTPATIENT
Start: 2024-03-11 | End: 2024-03-11 | Stop reason: HOSPADM

## 2024-03-11 RX ORDER — ONDANSETRON HYDROCHLORIDE 2 MG/ML
INJECTION, SOLUTION INTRAMUSCULAR; INTRAVENOUS
Status: DISCONTINUED | OUTPATIENT
Start: 2024-03-11 | End: 2024-03-11

## 2024-03-11 RX ORDER — IBUPROFEN 200 MG
600 TABLET ORAL ONCE
Status: COMPLETED | OUTPATIENT
Start: 2024-03-11 | End: 2024-03-11

## 2024-03-11 RX ORDER — LIDOCAINE HYDROCHLORIDE 20 MG/ML
JELLY TOPICAL ONCE
Status: DISCONTINUED | OUTPATIENT
Start: 2024-03-11 | End: 2024-03-11 | Stop reason: HOSPADM

## 2024-03-11 RX ORDER — SODIUM CHLORIDE, SODIUM LACTATE, POTASSIUM CHLORIDE, CALCIUM CHLORIDE 600; 310; 30; 20 MG/100ML; MG/100ML; MG/100ML; MG/100ML
INJECTION, SOLUTION INTRAVENOUS CONTINUOUS
Status: DISCONTINUED | OUTPATIENT
Start: 2024-03-11 | End: 2024-03-11 | Stop reason: HOSPADM

## 2024-03-11 RX ORDER — DEXAMETHASONE SODIUM PHOSPHATE 4 MG/ML
INJECTION, SOLUTION INTRA-ARTICULAR; INTRALESIONAL; INTRAMUSCULAR; INTRAVENOUS; SOFT TISSUE
Status: DISCONTINUED | OUTPATIENT
Start: 2024-03-11 | End: 2024-03-11

## 2024-03-11 RX ORDER — OXYCODONE AND ACETAMINOPHEN 5; 325 MG/1; MG/1
1 TABLET ORAL
Status: DISCONTINUED | OUTPATIENT
Start: 2024-03-11 | End: 2024-03-11 | Stop reason: HOSPADM

## 2024-03-11 RX ORDER — ONDANSETRON HYDROCHLORIDE 2 MG/ML
4 INJECTION, SOLUTION INTRAVENOUS DAILY PRN
Status: DISCONTINUED | OUTPATIENT
Start: 2024-03-11 | End: 2024-03-11 | Stop reason: HOSPADM

## 2024-03-11 RX ORDER — LIDOCAINE HYDROCHLORIDE 20 MG/ML
JELLY TOPICAL
Status: DISCONTINUED | OUTPATIENT
Start: 2024-03-11 | End: 2024-03-11 | Stop reason: HOSPADM

## 2024-03-11 RX ORDER — CIPROFLOXACIN 2 MG/ML
INJECTION, SOLUTION INTRAVENOUS
Status: COMPLETED
Start: 2024-03-11 | End: 2024-03-11

## 2024-03-11 RX ORDER — LIDOCAINE HYDROCHLORIDE 20 MG/ML
JELLY TOPICAL
Status: DISCONTINUED
Start: 2024-03-11 | End: 2024-03-11 | Stop reason: HOSPADM

## 2024-03-11 RX ORDER — MIDAZOLAM HYDROCHLORIDE 1 MG/ML
INJECTION INTRAMUSCULAR; INTRAVENOUS
Status: DISCONTINUED | OUTPATIENT
Start: 2024-03-11 | End: 2024-03-11

## 2024-03-11 RX ORDER — PROPOFOL 10 MG/ML
VIAL (ML) INTRAVENOUS
Status: DISCONTINUED | OUTPATIENT
Start: 2024-03-11 | End: 2024-03-11

## 2024-03-11 RX ORDER — CIPROFLOXACIN 2 MG/ML
400 INJECTION, SOLUTION INTRAVENOUS
Status: COMPLETED | OUTPATIENT
Start: 2024-03-11 | End: 2024-03-11

## 2024-03-11 RX ORDER — FENTANYL CITRATE 50 UG/ML
25 INJECTION, SOLUTION INTRAMUSCULAR; INTRAVENOUS EVERY 5 MIN PRN
Status: DISCONTINUED | OUTPATIENT
Start: 2024-03-11 | End: 2024-03-11 | Stop reason: HOSPADM

## 2024-03-11 RX ADMIN — ONDANSETRON HYDROCHLORIDE 4 MG: 2 INJECTION, SOLUTION INTRAMUSCULAR; INTRAVENOUS at 01:03

## 2024-03-11 RX ADMIN — IBUPROFEN 600 MG: 200 TABLET, FILM COATED ORAL at 02:03

## 2024-03-11 RX ADMIN — LIDOCAINE HYDROCHLORIDE 50 MG: 20 INJECTION, SOLUTION EPIDURAL; INFILTRATION; INTRACAUDAL; PERINEURAL at 01:03

## 2024-03-11 RX ADMIN — DEXMEDETOMIDINE HYDROCHLORIDE 4 MCG: 100 INJECTION, SOLUTION INTRAVENOUS at 01:03

## 2024-03-11 RX ADMIN — PROPOFOL 200 MG: 10 INJECTION, EMULSION INTRAVENOUS at 01:03

## 2024-03-11 RX ADMIN — FENTANYL CITRATE 50 MCG: 0.05 INJECTION, SOLUTION INTRAMUSCULAR; INTRAVENOUS at 01:03

## 2024-03-11 RX ADMIN — SODIUM CHLORIDE, SODIUM LACTATE, POTASSIUM CHLORIDE, AND CALCIUM CHLORIDE: 600; 310; 30; 20 INJECTION, SOLUTION INTRAVENOUS at 01:03

## 2024-03-11 RX ADMIN — MIDAZOLAM HYDROCHLORIDE 2 MG: 1 INJECTION INTRAMUSCULAR; INTRAVENOUS at 01:03

## 2024-03-11 RX ADMIN — CIPROFLOXACIN 400 MG: 2 INJECTION, SOLUTION INTRAVENOUS at 01:03

## 2024-03-11 RX ADMIN — DEXAMETHASONE SODIUM PHOSPHATE 4 MG: 4 INJECTION, SOLUTION INTRA-ARTICULAR; INTRALESIONAL; INTRAMUSCULAR; INTRAVENOUS; SOFT TISSUE at 01:03

## 2024-03-11 NOTE — ANESTHESIA POSTPROCEDURE EVALUATION
Anesthesia Post Evaluation    Patient: Arlyn Vaughan    Procedure(s) Performed: Procedure(s) (LRB):  CYSTOSCOPY, WITH PERIURETHRAL BULKING AGENT INJECTION (N/A)    Final Anesthesia Type: general      Patient location during evaluation: PACU  Patient participation: Yes- Able to Participate  Level of consciousness: awake  Post-procedure vital signs: reviewed and stable  Pain management: adequate  Airway patency: patent    PONV status at discharge: No PONV  Anesthetic complications: no      Cardiovascular status: stable  Respiratory status: unassisted  Hydration status: euvolemic  Follow-up not needed.              Vitals Value Taken Time   /68 03/11/24 1405   Temp 36.6 °C (97.9 °F) 03/11/24 1346   Pulse 60 03/11/24 1410   Resp 18 03/11/24 1410   SpO2 99 % 03/11/24 1410   Vitals shown include unvalidated device data.      No case tracking events are documented in the log.      Pain/Romero Score: Romero Score: 10 (3/11/2024  2:06 PM)

## 2024-03-11 NOTE — DISCHARGE SUMMARY
[facility]  Discharge Note  Short Stay    Procedure(s) (LRB):  CYSTOSCOPY, WITH PERIURETHRAL BULKING AGENT INJECTION (N/A)      OUTCOME: Patient tolerated treatment/procedure well without complication and is now ready for discharge.    DISPOSITION: Home or Self Care    FINAL DIAGNOSIS:  Stress incontinence in female    FOLLOWUP: In clinic    DISCHARGE INSTRUCTIONS:    Discharge Procedure Orders   Diet Adult Regular     Notify your health care provider if you experience any of the following:  temperature >100.4     Notify your health care provider if you experience any of the following:  persistent nausea and vomiting or diarrhea     Notify your health care provider if you experience any of the following:  severe uncontrolled pain     No dressing needed     Activity as tolerated         Clinical Reference Documents Added to Patient Instructions         Document    CYSTOSCOPY DISCHARGE INSTRUCTIONS (ENGLISH)    GENERAL ANESTHESIA DISCHARGE INSTRUCTIONS (ENGLISH)            TIME SPENT ON DISCHARGE: 10 minutes

## 2024-03-11 NOTE — PLAN OF CARE
Discharge instructions reviewed with pt and spouse, handouts and given, they verbalized understanding with no further questions at this time. Clinic will call to schedule post op appointment with MD telephone number provided per AVS. VSS on RA, mild pressure discomfort, no nausea noted, tolerating po fluids without difficulty, voided 2x. Fall precautions reviewed, consents in chart, PIV removed.    Prednisone Counseling:  I discussed with the patient the risks of prolonged use of prednisone including but not limited to weight gain, insomnia, osteoporosis, mood changes, diabetes, susceptibility to infection, glaucoma and high blood pressure.  In cases where prednisone use is prolonged, patients should be monitored with blood pressure checks, serum glucose levels and an eye exam.  Additionally, the patient may need to be placed on GI prophylaxis, PCP prophylaxis, and calcium and vitamin D supplementation and/or a bisphosphonate.  The patient verbalized understanding of the proper use and the possible adverse effects of prednisone.  All of the patient's questions and concerns were addressed.

## 2024-03-11 NOTE — OP NOTE
Date: 03/11/2024      Procedure: Cystoscopy w urethral bulking injections    Surgeon: Keira Riggins MD    Pre-op Diagnosis  Stress urinary incontinence      Post-op Diagnosis  Same    Estimated Blood Loss: 2cc    Drains: none    Complications: None    Specimens: No specimens collected during this procedure.    Implants: * No implants in log *    Disposition: PACU - hemodynamically stable.    Condition: stable    Indication for procedure:    48 y.o.yo female with h/o  stress urinary incontinence presents today for treatment with urethral bulking injections.     Procedure in detail:    Informed consent was obtained. The patient was premedicated and brought back to the operative suite. They were placed on the cystoscopy table in the supine position. Sequential compression devices were placed on her lower extremities bilaterally. The patient underwent anesthesia. They were then placed in the dorsal lithotomy position and all pressure points were padded. The genitalia was prepped and draped in the usual sterile fashion. A formal timeout was performed. I began the procedure by advancing the Bulkamid 0 degree scope with the sheath into the urethra. Systematic review was performed of the bladder revealing no stone, foreign body or tumor. Bilateral ureteral orifices were visualized and noted to be effluxing clear urine. The Bulkamid needle was then advanced into the rotatable sheath until the tip of the sheath was adjacent to the bladder neck. The sheath was then rotated to the 7 o'clock position. The needle was then extended into the bladder until the 2cm antonia on the needle was visible. The Bulkamid system was then retracted until the tip of the needle was resting on the bladder neck. The needle was then retracted into the sheath and approximately 1.5cm from the bladder neck the Bulkamid system was pressed parallel against the urethral wall and the needle was advanced into the submucosal tissue, ensuring that the bevel  of the needle was facing towards the lumen. The needle was advanced approximately 0.5cm, and the Bulkamid hydrogel was then injected until the Bulkamid cushion was visible and reached the midline of the urethral lumen. The needle was then retracted back into the rotatable sheath and rotated to the next injection site. Subsequent injections were performed at 5 o'clock, 2 o'clock and 10 o'clock all along the same plane as the original injection until all 4 cushions met at the midline of the urethral lumen. 2mLs total of Bulkamid Hydrogel was used. The bladder was left full at the conclusion of the procedure. The patient tolerated the procedure well and awakened in good condition.

## 2024-03-11 NOTE — TRANSFER OF CARE
"Anesthesia Transfer of Care Note    Patient: Arlyn Vaughan    Procedure(s) Performed: Procedure(s) (LRB):  CYSTOSCOPY, WITH PERIURETHRAL BULKING AGENT INJECTION (N/A)    Patient location: PACU    Anesthesia Type: general    Transport from OR: Transported from OR on room air with adequate spontaneous ventilation    Post pain: adequate analgesia    Post assessment: no apparent anesthetic complications    Post vital signs: stable    Level of consciousness: awake    Complications: none    Transfer of care protocol was followed      Last vitals: Visit Vitals  BP (!) 123/58 (BP Location: Right arm, Patient Position: Sitting)   Pulse 75   Temp 36.6 °C (97.9 °F) (Temporal)   Resp 17   Ht 5' 6" (1.676 m)   Wt 82.7 kg (182 lb 6.9 oz)   SpO2 97%   Breastfeeding No   BMI 29.45 kg/m²     "

## 2024-03-12 ENCOUNTER — TELEPHONE (OUTPATIENT)
Dept: UROLOGY | Facility: CLINIC | Age: 49
End: 2024-03-12
Payer: COMMERCIAL

## 2024-03-12 VITALS
HEART RATE: 56 BPM | DIASTOLIC BLOOD PRESSURE: 54 MMHG | RESPIRATION RATE: 18 BRPM | BODY MASS INDEX: 29.32 KG/M2 | SYSTOLIC BLOOD PRESSURE: 112 MMHG | OXYGEN SATURATION: 99 % | HEIGHT: 66 IN | WEIGHT: 182.44 LBS | TEMPERATURE: 98 F

## 2024-03-12 NOTE — TELEPHONE ENCOUNTER
03/12/2024 1640 - .Outgoing call placed to patient, patient verified name and date of birth, patient notified that Dr. Riggins would like to see her in 2 wks for post op f/u, patient unable to see her at [place] in 2 wk, agreed to see Dr. Riggins in [place] with more availability on 03/26/2024 @ 10am, no further assistance needed.    Raman Reddy RN

## 2024-03-14 ENCOUNTER — TELEPHONE (OUTPATIENT)
Dept: UROLOGY | Facility: CLINIC | Age: 49
End: 2024-03-14
Payer: COMMERCIAL

## 2024-03-19 ENCOUNTER — CLINICAL SUPPORT (OUTPATIENT)
Dept: REHABILITATION | Facility: HOSPITAL | Age: 49
End: 2024-03-19
Payer: COMMERCIAL

## 2024-03-19 DIAGNOSIS — M62.89 PELVIC FLOOR DYSFUNCTION: Primary | ICD-10-CM

## 2024-03-19 PROCEDURE — 97112 NEUROMUSCULAR REEDUCATION: CPT | Mod: PN

## 2024-03-19 NOTE — PROGRESS NOTES
Pelvic Health Physical Therapy   Updated Plan of Care Note     Name: Arlyn Vaughan  Clinic Number: [id number]    Therapy Diagnosis:   Encounter Diagnosis   Name Primary?    Pelvic floor dysfunction Yes         Physician: Keira Riggins MD    Visit Date: 3/19/2024    Physician Orders: PT Eval and Treat   Medical Diagnosis from Referral: Stress incontinence in female [N39.3], [place]-Walker grade 2 cystocele [N81.10]   Evaluation Date: 11/30/2023  Authorization Period Expiration: 12/01/2024  UPDATED Plan of Care Expiration: 04/30/2024; 02/08/2024   Progress Note Due: 04/19/2024  Visit # / Visits authorized: 5/20 ; 2/20 + eval   FOTO: Issued Visit #: 2 /3      Precautions: Standard    Time In: 1:30 PM  Time Out: 2:15 PM  Total Billable Time: 45 minutes    Precautions: Standard    Subjective     Pt reports: Bulkimed procedure done 2 weeks ago for JOSE. She has seen a great improvement in her symptoms. She reports only 1 incident of leakage with a cough since her procedure. She is continuing to perform double voiding techniques for bladder emptying. She is continuing to perform her Kegels at home as well. She is scheduled to have a nerve block soon.     She was compliant with home exercise program.  Response to previous treatment: no adverse symptoms   Functional change: less frequency of leakage (generally quarter size with stress)    Pain: 5/10    Location: low back    Objective     Deferred intravaginal assessment today due to recent invasive procedure to pelvic floor.     ORTHO SCREEN  Posture in sitting: WNL  Posture in standing: increased lordosis     ABDOMINAL WALL ASSESSMENT  Abdominal strength: Transverse abdominus: good        BREATHING MECHANICS ASSESSMENT   Thorax Assessment During Deep Respiration: Decreased excursion bilaterally of lateral ribs ; Excessive excursion of sternum          Limitation/Restriction for FOTO Pelvic Floor Survey     Therapist reviewed FOTO scores for Arlyn Vaughan on 11/30/2023.    FOTO documents entered into [employer] - see Media section.     Limitation Score:              Treatment     NATTY participated in neuromuscular re-education activities to develop Coordination, Control, Down training, Proprioception, and Sense for 25 minutes including:     Standing heel raises 3x10  Mini squats 3x10  Carries with 10# ball 3 laps   Shuttle 5 bands 3 minutes  Lumbar ext Med ex 30# 3x10 - cues for core activation    NATTY participated in dynamic functional therapeutic activities to improve functional performance for 00  minutes, including:    Bridges with kegel + TA co contraction x10  Sit to stand with PFM + TA contraction x8  Dead lift 10# KB x5  Kegels with load transfer box + 10# ball added  SLED pushes with Kegel hold x 1 lap       Home Exercises Provided and Patient Education Provided     Education provided:   - bladder irritants, anatomy/physiology of pelvic floor, posture/body mechanices, bladder retraining, diaphragmatic breathing, double voiding techniques, isometric abdominal exercises, kegels, proper bearing down techniques, fluid intake/dietary modifications, and Coordination of kegels with functional activities such as cough, laugh, sneeze, lift, etc.   Discussed progression of plan of care with patient; educated pt in activity modification; reviewed HEP with pt. Pt demonstrated and verbalized understanding of all instruction and was provided with a handout of HEP (see Patient Instructions).    Written Home Exercises Provided: Patient instructed to cont prior HEP.  Exercises were reviewed and NATTY was able to demonstrate them prior to the end of the session.  NATTY demonstrated good  understanding of the education provided.     See EMR under Patient Instructions for exercises provided 12/12/2023 and prior visit.    Assessment     Natty presents to therapy today after recently receiving a procedure to her urethra to improve stress incontinence. Patient has seen great improvement in  overall urinary leakage with cough/sneeze/laugh and ADLs. Patient will continue to benefit from therapy to improve pelvic floor musculature with functional activities so patient is comfortable performing activities at the gym without incidents of leakage.    NATTY Is progressing well towards her goals.   Pt prognosis is Good.     Pt will continue to benefit from skilled outpatient physical therapy to address the deficits listed in the problem list box on initial evaluation, provide pt/family education and to maximize pt's level of independence in the home and community environment.     Pt's spiritual, cultural and educational needs considered and pt agreeable to plan of care and goals.     Anticipated barriers to physical therapy: none    Goals:  Short Term Goals: 6 weeks    - Pt will demonstrate excellent knowledge and adherence to HEP to facilitate optimal recovery. MET 3/19/2024  - Pt will demonstrate proper PFM contraction, relaxation, and lengthening coordinated with TA and breath for improved muscle coordination needed for functional activity. MET 3/19/2024  - Pt will demonstrate proper breathing technique to help with calming the nervous system in order to improve abdominal wall and PF musculature extensibility for decreased pain, improved voiding ability, and improved QOL. MET 3/19/2024  - Pt to increase pelvic floor strength to at least 2/5 to demonstrate improved strength needed for continence with ADLs. Progressing  - Pt will report no incidence of urinary incontinence 4/7 days for improved hygiene and ADL/IADL tolerance. MET 3/19/2024    Long Term Goals: 10 weeks     - Pt will demonstrate excellent knowledge and adherence to HEP for continued self-maintenance of symptoms. MET 3/19/2024  - Pt will report PFDI Urianry FOTO score of 0% limited indicating clinically relevant increase in function. MET 3/19/2024  - Pt will report voiding interval of 2-3 hours for improved ADL tolerance. MET 3/19/2024  - Pt  will report no incidence of urinary incontinence 6/7 days for improved hygiene and ADL/IADL tolerance. MET 3/19/2024  - Pt will demonstrate PFM strength of at least 3/5 MMT for improved strength needed to maintain continence. Progressing  - Pt to be able to perform a 3 second kegel x 10 reps with good quality to demonstrate improving strength and endurance needed for continence.Progressing  - Pt will report bearing down appropriately 100% of the time for improved bowel function and decreased stress on adjacent pelvic structures. Progressing  - Pt will be able to successfully complete sexual intercourse without pain for improved ADL tolerance. Progressing  - Pt will report ability to tolerate speculum exam without pain for improved access to healthcare. Progressing  - Pt to be educated in pelvic muscle bracing and be able to consistently perform correctly and quickly to help decrease incontinence with cough/laugh/sneeze. MET 3/19/2024  - Pt will be able to correctly and consistently explain urge control strategies to demonstrate understanding of these strategies, decrease likelihood of leakage, and increase time between voids. MET 3/19/2024  - Pt to report improved restorative sleeping, waking up no more than 0-1x/night due to urge to urinate. Progressing  - Pt will report ability to successfully perform double voiding for complete urinary emptying after initial void to prevent immediate return to bathroom. MET 3/19/2024     PLAN      UPDATED Plan of care Certification: 03/19/2024 to 04/30/2024.     Outpatient Physical Therapy 1 time(s) every 3 week(s) for 6 weeks to include the following interventions: Electrical Stimulation TENS/IFC, Manual Therapy, Moist Heat/ Ice, Neuromuscular Re-ed, Patient Education, Self Care, Therapeutic Activities, and Therapeutic Exercise, FDN, and ASTYM.     Ash Phoenix, PT

## 2024-03-19 NOTE — TELEPHONE ENCOUNTER
----- Message from Desi Brandt sent at 1/27/2022 10:37 AM CST -----  Regarding: colonoscopy  Contact: patient  Pateint has some questions about her upcoming colonoscopy, please call her back at [phone]      Detail Level: Zone Sunscreen Recommendations: Broad spectrum SPF 30+ UVA / UVB recommended to patient Detail Level: Generalized

## 2024-03-20 NOTE — PLAN OF CARE
Pelvic Health Physical Therapy   Updated Plan of Care Note     Name: Arlyn Vaughan  Clinic Number: [id number]    Therapy Diagnosis:   Encounter Diagnosis   Name Primary?    Pelvic floor dysfunction Yes         Physician: Keira Riggins MD    Visit Date: 3/19/2024    Physician Orders: PT Eval and Treat   Medical Diagnosis from Referral: Stress incontinence in female [N39.3], [place]-Walker grade 2 cystocele [N81.10]   Evaluation Date: 11/30/2023  Authorization Period Expiration: 12/01/2024  UPDATED Plan of Care Expiration: 04/30/2024; 02/08/2024   Progress Note Due: 04/19/2024  Visit # / Visits authorized: 5/20 ; 2/20 + eval   FOTO: Issued Visit #: 2 /3      Precautions: Standard    Time In: 1:30 PM  Time Out: 2:15 PM  Total Billable Time: 45 minutes    Precautions: Standard    Subjective     Pt reports: Bulkimed procedure done 2 weeks ago for JOSE. She has seen a great improvement in her symptoms. She reports only 1 incident of leakage with a cough since her procedure. She is continuing to perform double voiding techniques for bladder emptying. She is continuing to perform her Kegels at home as well. She is scheduled to have a nerve block soon.     She was compliant with home exercise program.  Response to previous treatment: no adverse symptoms   Functional change: less frequency of leakage (generally quarter size with stress)    Pain: 5/10    Location: low back    Objective     Deferred intravaginal assessment today due to recent invasive procedure to pelvic floor.     ORTHO SCREEN  Posture in sitting: WNL  Posture in standing: increased lordosis     ABDOMINAL WALL ASSESSMENT  Abdominal strength: Transverse abdominus: good        BREATHING MECHANICS ASSESSMENT   Thorax Assessment During Deep Respiration: Decreased excursion bilaterally of lateral ribs ; Excessive excursion of sternum          Limitation/Restriction for FOTO Pelvic Floor Survey     Therapist reviewed FOTO scores for Arlyn Vaughan on 11/30/2023.    FOTO documents entered into [facility] - see Media section.     Limitation Score:              Home Exercises Provided and Patient Education Provided     Education provided:   - bladder irritants, anatomy/physiology of pelvic floor, posture/body mechanices, bladder retraining, diaphragmatic breathing, double voiding techniques, isometric abdominal exercises, kegels, proper bearing down techniques, fluid intake/dietary modifications, and Coordination of kegels with functional activities such as cough, laugh, sneeze, lift, etc.   Discussed progression of plan of care with patient; educated pt in activity modification; reviewed HEP with pt. Pt demonstrated and verbalized understanding of all instruction and was provided with a handout of HEP (see Patient Instructions).    Written Home Exercises Provided: Patient instructed to cont prior HEP.  Exercises were reviewed and NATTY was able to demonstrate them prior to the end of the session.  NATTY demonstrated good  understanding of the education provided.     See EMR under Patient Instructions for exercises provided 12/12/2023 and prior visit.    Assessment     Natty presents to therapy today after recently receiving a procedure to her urethra to improve stress incontinence. Patient has seen great improvement in overall urinary leakage with cough/sneeze/laugh and ADLs. Patient will continue to benefit from therapy to improve pelvic floor musculature with functional activities so patient is comfortable performing activities at the gym without incidents of leakage.    NATTY Is progressing well towards her goals.   Pt prognosis is Good.     Pt will continue to benefit from skilled outpatient physical therapy to address the deficits listed in the problem list box on initial evaluation, provide pt/family education and to maximize pt's level of independence in the home and community environment.     Pt's spiritual, cultural and educational needs considered and pt agreeable to  plan of care and goals.     Anticipated barriers to physical therapy: none    Goals:  Short Term Goals: 6 weeks    - Pt will demonstrate excellent knowledge and adherence to HEP to facilitate optimal recovery. MET 3/19/2024  - Pt will demonstrate proper PFM contraction, relaxation, and lengthening coordinated with TA and breath for improved muscle coordination needed for functional activity. MET 3/19/2024  - Pt will demonstrate proper breathing technique to help with calming the nervous system in order to improve abdominal wall and PF musculature extensibility for decreased pain, improved voiding ability, and improved QOL. MET 3/19/2024  - Pt to increase pelvic floor strength to at least 2/5 to demonstrate improved strength needed for continence with ADLs. Progressing  - Pt will report no incidence of urinary incontinence 4/7 days for improved hygiene and ADL/IADL tolerance. MET 3/19/2024    Long Term Goals: 10 weeks     - Pt will demonstrate excellent knowledge and adherence to HEP for continued self-maintenance of symptoms. MET 3/19/2024  - Pt will report PFDI Urianry FOTO score of 0% limited indicating clinically relevant increase in function. MET 3/19/2024  - Pt will report voiding interval of 2-3 hours for improved ADL tolerance. MET 3/19/2024  - Pt will report no incidence of urinary incontinence 6/7 days for improved hygiene and ADL/IADL tolerance. MET 3/19/2024  - Pt will demonstrate PFM strength of at least 3/5 MMT for improved strength needed to maintain continence. Progressing  - Pt to be able to perform a 3 second kegel x 10 reps with good quality to demonstrate improving strength and endurance needed for continence.Progressing  - Pt will report bearing down appropriately 100% of the time for improved bowel function and decreased stress on adjacent pelvic structures. Progressing  - Pt will be able to successfully complete sexual intercourse without pain for improved ADL tolerance. Progressing  - Pt will  report ability to tolerate speculum exam without pain for improved access to healthcare. Progressing  - Pt to be educated in pelvic muscle bracing and be able to consistently perform correctly and quickly to help decrease incontinence with cough/laugh/sneeze. MET 3/19/2024  - Pt will be able to correctly and consistently explain urge control strategies to demonstrate understanding of these strategies, decrease likelihood of leakage, and increase time between voids. MET 3/19/2024  - Pt to report improved restorative sleeping, waking up no more than 0-1x/night due to urge to urinate. Progressing  - Pt will report ability to successfully perform double voiding for complete urinary emptying after initial void to prevent immediate return to bathroom. MET 3/19/2024     PLAN      UPDATED Plan of care Certification: 03/19/2024 to 04/30/2024.     Outpatient Physical Therapy 1 time(s) every 3 week(s) for 6 weeks to include the following interventions: Electrical Stimulation TENS/IFC, Manual Therapy, Moist Heat/ Ice, Neuromuscular Re-ed, Patient Education, Self Care, Therapeutic Activities, and Therapeutic Exercise, FDN, and ASTYM.     Ash Phoenix, PT

## 2024-03-27 ENCOUNTER — OFFICE VISIT (OUTPATIENT)
Dept: UROLOGY | Facility: CLINIC | Age: 49
End: 2024-03-27
Payer: COMMERCIAL

## 2024-03-27 VITALS
DIASTOLIC BLOOD PRESSURE: 74 MMHG | HEART RATE: 69 BPM | HEIGHT: 66 IN | TEMPERATURE: 98 F | WEIGHT: 178.56 LBS | RESPIRATION RATE: 18 BRPM | BODY MASS INDEX: 28.7 KG/M2 | SYSTOLIC BLOOD PRESSURE: 114 MMHG

## 2024-03-27 DIAGNOSIS — N39.3 STRESS INCONTINENCE IN FEMALE: Primary | ICD-10-CM

## 2024-03-27 LAB
BILIRUB UR QL STRIP: NEGATIVE
GLUCOSE UR QL STRIP: NEGATIVE
KETONES UR QL STRIP: NEGATIVE
LEUKOCYTE ESTERASE UR QL STRIP: NEGATIVE
PH, POC UA: 6
POC BLOOD, URINE: NEGATIVE
POC NITRATES, URINE: NEGATIVE
POC RESIDUAL URINE VOLUME: 91 ML (ref 0–100)
PROT UR QL STRIP: NEGATIVE
SP GR UR STRIP: 1.02 (ref 1–1.03)
UROBILINOGEN UR STRIP-ACNC: 0.2 (ref 0.1–1.1)

## 2024-03-27 PROCEDURE — 51798 US URINE CAPACITY MEASURE: CPT | Mod: S$GLB,,, | Performed by: UROLOGY

## 2024-03-27 PROCEDURE — 99999 PR PBB SHADOW E&M-EST. PATIENT-LVL V: CPT | Mod: PBBFAC,,, | Performed by: UROLOGY

## 2024-03-27 PROCEDURE — 3074F SYST BP LT 130 MM HG: CPT | Mod: CPTII,S$GLB,, | Performed by: UROLOGY

## 2024-03-27 PROCEDURE — 1159F MED LIST DOCD IN RCRD: CPT | Mod: CPTII,S$GLB,, | Performed by: UROLOGY

## 2024-03-27 PROCEDURE — 3078F DIAST BP <80 MM HG: CPT | Mod: CPTII,S$GLB,, | Performed by: UROLOGY

## 2024-03-27 PROCEDURE — 99024 POSTOP FOLLOW-UP VISIT: CPT | Mod: S$GLB,,, | Performed by: UROLOGY

## 2024-03-27 PROCEDURE — 3044F HG A1C LEVEL LT 7.0%: CPT | Mod: CPTII,S$GLB,, | Performed by: UROLOGY

## 2024-03-27 PROCEDURE — 81003 URINALYSIS AUTO W/O SCOPE: CPT | Mod: QW,S$GLB,, | Performed by: UROLOGY

## 2024-03-27 NOTE — PROGRESS NOTES
Chief Complaint   Patient presents with    Post-op Evaluation     2 wk post op       History of Present Illness:   Arlyn Vaughan is a 48 y.o. female here for evaluation of Post-op Evaluation (2 wk post op)      3/27/24-S/P Bulkamid a few weeks ago. Pt happy with results. No longer wearing liners. Minimal JOSE. No post-op pain or any significant hematuria.   2/19/24-Pt attended pelvic floor PT and is improved, but still having JOSE at times. Wears pads only if she knows she is going to be doing activity. A little UUI. No dysuria/gross hematuria.  11/20/23-47yo female, here for evaluation of urinary incontinence. Pt reports that for the past couple of years, she ahs been having JOSE with sneeze, laugh. Progressively getting worse. Wears a liner, especially when she goes to the gym. She can even leak with jumping, lifting. She reports incontinence even with intercourse. In October, she had her first diagnosed UTI. Symptoms were painful and went on for a week, so she went to urgent care.   She saw Dr. Hunt, who prescribed vesicare, which didn't help with the incontinence, but caused an intermittent urinary stream. She has since stopped it.   She does report some urgency, which may occur as she gets close to the restroom. Sometimes she has UUI on the way.   She struggles with constipation. She was recently diagnosed with IBS. Taking probiotics and drinking Plexus.       Review of Systems   Constitutional:  Positive for fatigue.   Respiratory:  Negative for shortness of breath.    Cardiovascular:  Negative for chest pain.   Gastrointestinal:  Negative for abdominal pain.   Musculoskeletal:  Positive for arthralgias.   All other systems reviewed and are negative.        Past Medical History:   Diagnosis Date    Anemia     Arthritis     Breast cyst     Fever blister     Graves disease     Hypothyroidism     Lichen sclerosus     Urinary incontinence 2021    It is getting worse.       Past Surgical History:   Procedure Laterality  Date    COLONOSCOPY N/A 02/02/2022    Procedure: COLONOSCOPY;  Surgeon: Fransico Miles III, MD;  Location: [facility];  Service: Endoscopy;  Laterality: N/A;    CYSTOSCOPY WITH INJECTION OF PERIURETHRAL BULKING AGENT N/A 3/11/2024    Procedure: CYSTOSCOPY, WITH PERIURETHRAL BULKING AGENT INJECTION;  Surgeon: Keira Riggins MD;  Location: [facility] OR;  Service: Urology;  Laterality: N/A;    SALPINGOOPHORECTOMY Left 2008    SHOULDER SURGERY Right        Family History   Problem Relation Age of Onset    Diabetes type II Father     Migraines Father     Heart disease Mother     Gallbladder disease Mother     Fibromyalgia Mother     Arrhythmia Mother     Breast cancer Mother     Osteoarthritis Mother     Rashes / Skin problems Mother     Thyroid disease Mother     Gallbladder disease Sister     Rashes / Skin problems Maternal Grandfather     Diabetes Paternal Grandmother        Social History     Tobacco Use    Smoking status: Never     Passive exposure: Never    Smokeless tobacco: Never   Substance Use Topics    Alcohol use: Never    Drug use: Never       Current Outpatient Medications   Medication Sig Dispense Refill    ascorbic acid, vitamin C, (VITAMIN C) 1000 MG tablet Take 1,000 mg by mouth.      biotin 10,000 mcg Cap Take by mouth.      busPIRone (BUSPAR) 5 MG Tab Take one tablet once a day      calcium carbonate/vitamin D3 (VITAMIN D-3 ORAL)       cetirizine (ZYRTEC) 10 MG tablet Take 10 mg by mouth once daily.      doxycycline (MONODOX) 50 MG Cap       estradiol 0.05 mg/24 hr td ptsw (VIVELLE-DOT) 0.05 mg/24 hr Place 1 patch onto the skin twice a week. 8 patch 11    ferrous sulfate (FEOSOL) 325 mg (65 mg iron) Tab tablet Take 325 mg by mouth.      hydrocortisone 2.5 % cream Apply topically.      levonorgestreL (MIRENA) 20 mcg/24 hours (7 yrs) 52 mg IUD 1 each by Intrauterine route once.      meloxicam (MOBIC) 7.5 MG tablet Take 7.5 mg by mouth every morning.      minoxidiL (LONITEN) 2.5 MG tablet        montelukast (SINGULAIR) 10 mg tablet Take 10 mg by mouth every evening.      multivitamin (THERAGRAN) per tablet Take 1 tablet by mouth once daily.      NP THYROID 120 mg Tab       NP THYROID 15 mg Tab       phentermine (ADIPEX-P) 37.5 mg tablet Take 37.5 mg by mouth before breakfast.      spironolactone (ALDACTONE) 100 MG tablet Take 1 tablet by mouth once daily.      VITAMIN B COMPLEX ORAL Take 1 tablet by mouth once daily.      vitamin D (VITAMIN D3) 1000 units Tab Take 1 tablet by mouth every morning.      ZINC ORAL Take 100 mg by mouth.      clobetasoL (TEMOVATE) 0.05 % cream Apply topically 2 (two) times daily. for 14 days 45 g 3     No current facility-administered medications for this visit.       Review of patient's allergies indicates:  No Known Allergies    Physical Exam  Vitals:    03/27/24 0942   BP: 114/74   Pulse: 69   Resp: 18   Temp: 97.6 °F (36.4 °C)     General: Well-developed, well-nourished, in no acute distress  HEENT: Normocephalic, atraumatic, extraocular movements intact  Neck: Supple, no supraclavicular or cervical lymphadenopathy, trachea midline  Respirations: even and unlabored  Back: midline spine, No CVA tenderness  Abdomen: soft, Non-tender, non-distended, no palpable masses, no rebound or guarding  : 11/20/23-Normal external female genitalia without lesions. Orthotopic urethral meatus. Fairly healthy vaginal mucosa. Grade 2 Cysotcele, + urethral hypermobility  Extremities: moves all equally, no clubbing, cyanosis or edema  Skin: Warm and dry. No lesions  Psych: normal affect  Neuro: Alert and oriented x 3. Cranial nerves II-XII intact    PVR: 91cc    UA: negative for blood, LE, nit    Assessment:  1. Stress incontinence in female  POCT Urinalysis, Dipstick, Automated, W/O Scope    POCT Bladder Scan            Plan:   Stress incontinence in female  -     POCT Urinalysis, Dipstick, Automated, W/O Scope  -     POCT Bladder Scan    Follow up in about 6 months (around  9/27/2024).

## 2024-04-09 ENCOUNTER — CLINICAL SUPPORT (OUTPATIENT)
Dept: REHABILITATION | Facility: HOSPITAL | Age: 49
End: 2024-04-09
Payer: COMMERCIAL

## 2024-04-09 DIAGNOSIS — M62.89 PELVIC FLOOR DYSFUNCTION: Primary | ICD-10-CM

## 2024-04-09 PROCEDURE — 97110 THERAPEUTIC EXERCISES: CPT | Mod: PN

## 2024-04-09 PROCEDURE — 97530 THERAPEUTIC ACTIVITIES: CPT | Mod: PN

## 2024-04-09 PROCEDURE — 97112 NEUROMUSCULAR REEDUCATION: CPT | Mod: PN

## 2024-04-09 NOTE — PROGRESS NOTES
Pelvic Health Physical Therapy   Treatment Note     Name: [place]  Clinic Number: [id number]    Therapy Diagnosis:   Encounter Diagnosis   Name Primary?    Pelvic floor dysfunction Yes         Physician: Keira Riggins MD    Visit Date: 3/19/2024    Physician Orders: PT Eval and Treat   Medical Diagnosis from Referral: Stress incontinence in female [N39.3], [place]-Walker grade 2 cystocele [N81.10]   Evaluation Date: 11/30/2023  Authorization Period Expiration: 12/01/2024  UPDATED Plan of Care Expiration: 04/30/2024; 02/08/2024   Progress Note Due: 04/19/2024  Visit # / Visits authorized: 6/20 ; 2/20 + eval   FOTO: Issued Visit #: 2 /3      Precautions: Standard    Time In: 10:30 PM  Time Out: 11:20  PM  Total Billable Time: 50  minutes    Precautions: Standard    Subjective     Pt reports: Only one drop of leakage when she sneezes which is very rarely. Had a nerve block on March 25th to her low back. She is continuing to work with chiropractor.     She was compliant with home exercise program.  Response to previous treatment: no adverse symptoms   Functional change: less frequency of leakage (generally quarter size with stress)    Pain: 5/10     Location: low back    Objective     Objective Measures updated at progress report unless specified.     Treatment     NATTY received therapeutic exercises to develop  strength, endurance, ROM, flexibility, and core stabilization for 10 minutes including:     Posterior pelvic tilts   Standing hip 3-way YTB x10 ea.   Piriformis stretch   Alex pose stretch     NATTY participated in neuromuscular re-education activities to develop Coordination, Control, Down training, Proprioception, and Sense for 25  minutes including:     TA contractions x15 cues to continue exhale throughout entire contraction  [place]off press 30# x20 ea.   Shuttle 6 bands 2 minutes DL  Shuttle 4 bands 1 minute ea. SL   Lumbar ext Med ex 30# 3x10 - cues for core activation    Next visit: core  "progression exercises    Not today:  Standing heel raises 3x10  Mini squats 3x10  Carries with 10# ball 3 laps     NATTY participated in dynamic functional therapeutic activities to improve functional performance for 15 minutes, including:    [place] carry 15# 1 lap ea.  STS 20" box 10# KB 3x10  SLED pushes 2 laps - cues for posture and body mechanics  Additional HEP: 7-way hip     Not today:  Bridges with kegel + TA co contraction x10  Sit to stand with PFM + TA contraction x8  Dead lift 10# KB x5  Kegels with load transfer box + 10# ball added  SLED pushes with Kegel hold x 1 lap       Home Exercises Provided and Patient Education Provided     Education provided:   - bladder irritants, anatomy/physiology of pelvic floor, posture/body mechanices, bladder retraining, diaphragmatic breathing, double voiding techniques, isometric abdominal exercises, kegels, proper bearing down techniques, fluid intake/dietary modifications, and Coordination of kegels with functional activities such as cough, laugh, sneeze, lift, etc.   Discussed progression of plan of care with patient; educated pt in activity modification; reviewed HEP with pt. Pt demonstrated and verbalized understanding of all instruction and was provided with a handout of HEP (see Patient Instructions).    Written Home Exercises Provided: Patient instructed to cont prior HEP.  Exercises were reviewed and NATTY was able to demonstrate them prior to the end of the session.  NATTY demonstrated good  understanding of the education provided.     See EMR under Patient Instructions for exercises provided 12/12/2023 and prior visit.    Assessment     Natty presents to therapy with much improvement in stress urinary incontinence. She reports minimal leakage with cough or sneeze. Focused on core activation with functional movements as well as progressing hip strengthening exercises. Planning for discharge next visit in 3 weeks.     NATTY Is progressing well towards " Neurology Attending her goals.   Pt prognosis is Good.     Pt will continue to benefit from skilled outpatient physical therapy to address the deficits listed in the problem list box on initial evaluation, provide pt/family education and to maximize pt's level of independence in the home and community environment.     Pt's spiritual, cultural and educational needs considered and pt agreeable to plan of care and goals.     Anticipated barriers to physical therapy: none    Goals:  Short Term Goals: 6 weeks    - Pt will demonstrate excellent knowledge and adherence to HEP to facilitate optimal recovery. MET 3/19/2024  - Pt will demonstrate proper PFM contraction, relaxation, and lengthening coordinated with TA and breath for improved muscle coordination needed for functional activity. MET 3/19/2024  - Pt will demonstrate proper breathing technique to help with calming the nervous system in order to improve abdominal wall and PF musculature extensibility for decreased pain, improved voiding ability, and improved QOL. MET 3/19/2024  - Pt to increase pelvic floor strength to at least 2/5 to demonstrate improved strength needed for continence with ADLs. Progressing  - Pt will report no incidence of urinary incontinence 4/7 days for improved hygiene and ADL/IADL tolerance. MET 3/19/2024    Long Term Goals: 10 weeks     - Pt will demonstrate excellent knowledge and adherence to HEP for continued self-maintenance of symptoms. MET 3/19/2024  - Pt will report PFDI Urianry FOTO score of 0% limited indicating clinically relevant increase in function. MET 3/19/2024  - Pt will report voiding interval of 2-3 hours for improved ADL tolerance. MET 3/19/2024  - Pt will report no incidence of urinary incontinence 6/7 days for improved hygiene and ADL/IADL tolerance. MET 3/19/2024  - Pt will demonstrate PFM strength of at least 3/5 MMT for improved strength needed to maintain continence. Progressing  - Pt to be able to perform a 3 second kegel x 10 reps  with good quality to demonstrate improving strength and endurance needed for continence.Progressing  - Pt will report bearing down appropriately 100% of the time for improved bowel function and decreased stress on adjacent pelvic structures. Progressing  - Pt will be able to successfully complete sexual intercourse without pain for improved ADL tolerance. Progressing  - Pt will report ability to tolerate speculum exam without pain for improved access to healthcare. Progressing  - Pt to be educated in pelvic muscle bracing and be able to consistently perform correctly and quickly to help decrease incontinence with cough/laugh/sneeze. MET 3/19/2024  - Pt will be able to correctly and consistently explain urge control strategies to demonstrate understanding of these strategies, decrease likelihood of leakage, and increase time between voids. MET 3/19/2024  - Pt to report improved restorative sleeping, waking up no more than 0-1x/night due to urge to urinate. Progressing  - Pt will report ability to successfully perform double voiding for complete urinary emptying after initial void to prevent immediate return to bathroom. MET 3/19/2024     PLAN      UPDATED Plan of care Certification: 03/19/2024 to 04/30/2024.     Outpatient Physical Therapy 1 time(s) every 3 week(s) for 6 weeks to include the following interventions: Electrical Stimulation TENS/IFC, Manual Therapy, Moist Heat/ Ice, Neuromuscular Re-ed, Patient Education, Self Care, Therapeutic Activities, and Therapeutic Exercise, FDN, and ASTYM.     Ash Phoenix, PT

## 2024-04-30 ENCOUNTER — CLINICAL SUPPORT (OUTPATIENT)
Dept: REHABILITATION | Facility: HOSPITAL | Age: 49
End: 2024-04-30
Payer: COMMERCIAL

## 2024-04-30 ENCOUNTER — PATIENT MESSAGE (OUTPATIENT)
Dept: UROLOGY | Facility: CLINIC | Age: 49
End: 2024-04-30
Payer: COMMERCIAL

## 2024-04-30 DIAGNOSIS — M62.89 PELVIC FLOOR DYSFUNCTION: Primary | ICD-10-CM

## 2024-04-30 PROCEDURE — 97112 NEUROMUSCULAR REEDUCATION: CPT | Mod: PN

## 2024-04-30 NOTE — PROGRESS NOTES
Pelvic Health Physical Therapy   Updated Plan of Care Note     Name: Arlyn Vaughan  Clinic Number: [id number]    Therapy Diagnosis:   Encounter Diagnosis   Name Primary?    Pelvic floor dysfunction Yes         Physician: Keira Riggins MD    Visit Date: 4/30/2024    Physician Orders: PT Eval and Treat   Medical Diagnosis from Referral: Stress incontinence in female [N39.3], [place]-Walker grade 2 cystocele [N81.10]   Evaluation Date: 11/30/2023  Authorization Period Expiration: 12/01/2024  UPDATED Plan of Care Expiration: 06/04/2024 ;  04/30/2024; 02/08/2024   Progress Note Due: ---  Visit # / Visits authorized: 7/20 ; 2/20 + eval   FOTO: Issued Visit #: 2 /3      Precautions: Standard    Time In: 10:20 PM  Time Out: 11:15  PM  Total Billable Time: 55 minutes    Precautions: Standard    Subjective     Pt reports: being sick for about a week and having increased stress urinary incontinence with hard coughs. She is currently wearing a panty liner 3x/day with a cough.     She was compliant with home exercise program.   Response to previous treatment: no adverse symptoms   Functional change: increased stress urinary incontinence    Pain: 5/10      Location: low back    Objective     Objective Measures updated at progress report unless specified.     VAGINAL PELVIC FLOOR EXAM     EXTERNAL ASSESSMENT  Introitus: gaping  Skin condition: redness noted  Scarring: none assessed today (patient reports Grade 5 tear with past pregnancy)   Sensation: WNL   Pain: none  Voluntary contraction: nil   Voluntary relaxation: nil   Involuntary contraction: nil  Bearing down: bulge                            INTERNAL ASSESSMENT  Pain: none   Sensation: able to localized pressure appropriately   Vaginal vault: roomy   Muscle Bulk: atrophy   Muscle Power: 1+/5   Muscle Endurance: 1 sec        Quality of contraction: slow rise   Coordination: tends to hold breath during PFM contration   Prolapse check: anterior vaginal wall weakness  "([place]-Walker grade 2 cystocele from referral)   Comment: patient bulges with cues for contraction     Treatment     NATTY received therapeutic exercises to develop  strength, endurance, ROM, flexibility, and core stabilization for 00  minutes including:     Posterior pelvic tilts   Standing hip 3-way YTB x10 ea.   Piriformis stretch   Alex pose stretch     NATTY participated in neuromuscular re-education activities to develop Coordination, Control, Down training, Proprioception, and Sense for 40  minutes including:     Supine Kegels - cues for lowering belly during exhale   Sidelying Kegels - improvements in decreasing TA activation with Kegel  Supine hip adduction + Kegel   Seated Kegel with hands pushing into ball  Seated Kegel while sitting on towel roll       NATTY participated in dynamic functional therapeutic activities to improve functional performance for 00 minutes, including:    [place] carry 15# 1 lap ea.  STS 20" box 10# KB 3x10  SLED pushes 2 laps - cues for posture and body mechanics  Additional HEP: 7-way hip     Not today:  Bridges with kegel + TA co contraction x10  Sit to stand with PFM + TA contraction x8  Dead lift 10# KB x5  Kegels with load transfer box + 10# ball added  SLED pushes with Kegel hold x 1 lap       Home Exercises Provided and Patient Education Provided     Education provided:   - bladder irritants, anatomy/physiology of pelvic floor, posture/body mechanices, bladder retraining, diaphragmatic breathing, double voiding techniques, isometric abdominal exercises, kegels, proper bearing down techniques, fluid intake/dietary modifications, and Coordination of kegels with functional activities such as cough, laugh, sneeze, lift, etc.   Discussed progression of plan of care with patient; educated pt in activity modification; reviewed HEP with pt. Pt demonstrated and verbalized understanding of all instruction and was provided with a handout of HEP (see Patient " "Instructions).    Written Home Exercises Provided: Patient instructed to cont prior HEP.  Exercises were reviewed and NATTY was able to demonstrate them prior to the end of the session.  NATTY demonstrated good  understanding of the education provided.     See EMR under Patient Instructions for exercises provided 12/12/2023 and prior visit.    Assessment     Natty presents to therapy today with increased stress urinary incontinence since recent procedure. Performed intravaginal assessment to reassess pelvic floor musculature. Continued weakness noted in pelvic floor musculature. Patient needing significant verbal cues for contraction due to patient bulging with cues. Improvements noted at end of therapy session with certain exercises. Patient given new HEP for "the knack" to improve urinary incontinence with coughing. Patient will continue benefiting from skilled physical therapy to improve pelvic floor strength, endurance, coordination, and proprioception.     NATTY Is progressing well towards her goals.   Pt prognosis is Good.     Pt will continue to benefit from skilled outpatient physical therapy to address the deficits listed in the problem list box on initial evaluation, provide pt/family education and to maximize pt's level of independence in the home and community environment.     Pt's spiritual, cultural and educational needs considered and pt agreeable to plan of care and goals.     Anticipated barriers to physical therapy: none    Goals:  Short Term Goals: 6 weeks    - Pt will demonstrate excellent knowledge and adherence to HEP to facilitate optimal recovery. MET 3/19/2024  - Pt will demonstrate proper PFM contraction, relaxation, and lengthening coordinated with TA and breath for improved muscle coordination needed for functional activity. MET 3/19/2024  - Pt will demonstrate proper breathing technique to help with calming the nervous system in order to improve abdominal wall and PF musculature " extensibility for decreased pain, improved voiding ability, and improved QOL. MET 3/19/2024  - Pt to increase pelvic floor strength to at least 2/5 to demonstrate improved strength needed for continence with ADLs. Progressing  - Pt will report no incidence of urinary incontinence 4/7 days for improved hygiene and ADL/IADL tolerance. MET 3/19/2024    Long Term Goals: 10 weeks     - Pt will demonstrate excellent knowledge and adherence to HEP for continued self-maintenance of symptoms. MET 3/19/2024  - Pt will report PFDI Urianry FOTO score of 0% limited indicating clinically relevant increase in function. MET 3/19/2024  - Pt will report voiding interval of 2-3 hours for improved ADL tolerance. MET 3/19/2024  - Pt will report no incidence of urinary incontinence 6/7 days for improved hygiene and ADL/IADL tolerance. MET 3/19/2024  - Pt will demonstrate PFM strength of at least 3/5 MMT for improved strength needed to maintain continence. Progressing  - Pt to be able to perform a 3 second kegel x 10 reps with good quality to demonstrate improving strength and endurance needed for continence.Progressing  - Pt will report bearing down appropriately 100% of the time for improved bowel function and decreased stress on adjacent pelvic structures. Progressing  - Pt will be able to successfully complete sexual intercourse without pain for improved ADL tolerance. Progressing  - Pt will report ability to tolerate speculum exam without pain for improved access to healthcare. Progressing  - Pt to be educated in pelvic muscle bracing and be able to consistently perform correctly and quickly to help decrease incontinence with cough/laugh/sneeze. MET 3/19/2024  - Pt will be able to correctly and consistently explain urge control strategies to demonstrate understanding of these strategies, decrease likelihood of leakage, and increase time between voids. MET 3/19/2024  - Pt to report improved restorative sleeping, waking up no more than  0-1x/night due to urge to urinate. Progressing  - Pt will report ability to successfully perform double voiding for complete urinary emptying after initial void to prevent immediate return to bathroom. MET 3/19/2024     PLAN      UPDATED Plan of care Certification: 04/30/2024 to 06/04/2024.     Outpatient Physical Therapy 1 time(s) every week for 5 weeks to include the following interventions: Electrical Stimulation TENS/IFC, Manual Therapy, Moist Heat/ Ice, Neuromuscular Re-ed, Patient Education, Self Care, Therapeutic Activities, and Therapeutic Exercise, FDN, and ASTYM.     Ash Phoenix, PT

## 2024-04-30 NOTE — PATIENT INSTRUCTIONS
"THE KNACK         When the pelvic floor is functioning correctly, it should automatically contract before and during an increase in abdominal pressure, such as with a cough or sneeze (see image on left). This contraction, called "the knack," closes off the urethra to prevent leakage of urine. This automatic contraction may diminish during pregnancy and childbirth, after surgical procedures, or with repeated straining during urination, resulting in urinary incontinence with high-pressure activities.     Actively working to reinforce this reflex through practice can help "the knack" become more automatic and prevent stress urinary incontinence. Here's how to practice the knack:  Sit upright in a comfortable chair.   Squeeze and lift your pelvic floor to perform a kegel.   Maintain this activation as you perform a small cough.  After you cough, relax your pelvic floor muscles.   Progressively increase the force behind the cough or perform several coughs in a row to practice maintaining your pelvic floor contraction.     "

## 2024-04-30 NOTE — PLAN OF CARE
Pelvic Health Physical Therapy   Updated Plan of Care Note     Name: Arlyn Vaughan  Clinic Number: [id number]    Therapy Diagnosis:   Encounter Diagnosis   Name Primary?    Pelvic floor dysfunction Yes         Physician: Keira Riggins MD    Visit Date: 4/30/2024    Physician Orders: PT Eval and Treat   Medical Diagnosis from Referral: Stress incontinence in female [N39.3], [place]-Walker grade 2 cystocele [N81.10]   Evaluation Date: 11/30/2023  Authorization Period Expiration: 12/01/2024  UPDATED Plan of Care Expiration: 06/04/2024 ;  04/30/2024; 02/08/2024   Progress Note Due: ---  Visit # / Visits authorized: 7/20 ; 2/20 + eval   FOTO: Issued Visit #: 2 /3      Precautions: Standard    Time In: 10:20 PM  Time Out: 11:15  PM  Total Billable Time: 55 minutes    Precautions: Standard    Subjective     Pt reports: being sick for about a week and having increased stress urinary incontinence with hard coughs. She is currently wearing a panty liner 3x/day with a cough.     She was compliant with home exercise program.   Response to previous treatment: no adverse symptoms   Functional change: increased stress urinary incontinence    Pain: 5/10      Location: low back    Objective     Objective Measures updated at progress report unless specified.     VAGINAL PELVIC FLOOR EXAM     EXTERNAL ASSESSMENT  Introitus: gaping  Skin condition: redness noted  Scarring: none assessed today (patient reports Grade 5 tear with past pregnancy)   Sensation: WNL   Pain: none  Voluntary contraction: nil   Voluntary relaxation: nil   Involuntary contraction: nil  Bearing down: bulge                            INTERNAL ASSESSMENT  Pain: none   Sensation: able to localized pressure appropriately   Vaginal vault: roomy   Muscle Bulk: atrophy   Muscle Power: 1+/5   Muscle Endurance: 1 sec        Quality of contraction: slow rise   Coordination: tends to hold breath during PFM contration   Prolapse check: anterior vaginal wall weakness  "([place]-Walker grade 2 cystocele from referral)   Comment: patient bulges with cues for contraction        Home Exercises Provided and Patient Education Provided     Education provided:   - bladder irritants, anatomy/physiology of pelvic floor, posture/body mechanices, bladder retraining, diaphragmatic breathing, double voiding techniques, isometric abdominal exercises, kegels, proper bearing down techniques, fluid intake/dietary modifications, and Coordination of kegels with functional activities such as cough, laugh, sneeze, lift, etc.   Discussed progression of plan of care with patient; educated pt in activity modification; reviewed HEP with pt. Pt demonstrated and verbalized understanding of all instruction and was provided with a handout of HEP (see Patient Instructions).    Written Home Exercises Provided: Patient instructed to cont prior HEP.  Exercises were reviewed and NATTY was able to demonstrate them prior to the end of the session.  NATTY demonstrated good  understanding of the education provided.     See EMR under Patient Instructions for exercises provided 12/12/2023 and prior visit.    Assessment     Natty presents to therapy today with increased stress urinary incontinence since recent procedure. Performed intravaginal assessment to reassess pelvic floor musculature. Continued weakness noted in pelvic floor musculature. Patient needing significant verbal cues for contraction due to patient bulging with cues. Improvements noted at end of therapy session with certain exercises. Patient given new HEP for "the knack" to improve urinary incontinence with coughing. Patient will continue benefiting from skilled physical therapy to improve pelvic floor strength, endurance, coordination, and proprioception.     NATTY Is progressing well towards her goals.   Pt prognosis is Good.     Pt will continue to benefit from skilled outpatient physical therapy to address the deficits listed in the problem list box " on initial evaluation, provide pt/family education and to maximize pt's level of independence in the home and community environment.     Pt's spiritual, cultural and educational needs considered and pt agreeable to plan of care and goals.     Anticipated barriers to physical therapy: none    Goals:  Short Term Goals: 6 weeks    - Pt will demonstrate excellent knowledge and adherence to HEP to facilitate optimal recovery. MET 3/19/2024  - Pt will demonstrate proper PFM contraction, relaxation, and lengthening coordinated with TA and breath for improved muscle coordination needed for functional activity. MET 3/19/2024  - Pt will demonstrate proper breathing technique to help with calming the nervous system in order to improve abdominal wall and PF musculature extensibility for decreased pain, improved voiding ability, and improved QOL. MET 3/19/2024  - Pt to increase pelvic floor strength to at least 2/5 to demonstrate improved strength needed for continence with ADLs. Progressing  - Pt will report no incidence of urinary incontinence 4/7 days for improved hygiene and ADL/IADL tolerance. MET 3/19/2024    Long Term Goals: 10 weeks     - Pt will demonstrate excellent knowledge and adherence to HEP for continued self-maintenance of symptoms. MET 3/19/2024  - Pt will report PFDI Urianry FOTO score of 0% limited indicating clinically relevant increase in function. MET 3/19/2024  - Pt will report voiding interval of 2-3 hours for improved ADL tolerance. MET 3/19/2024  - Pt will report no incidence of urinary incontinence 6/7 days for improved hygiene and ADL/IADL tolerance. MET 3/19/2024  - Pt will demonstrate PFM strength of at least 3/5 MMT for improved strength needed to maintain continence. Progressing  - Pt to be able to perform a 3 second kegel x 10 reps with good quality to demonstrate improving strength and endurance needed for continence.Progressing  - Pt will report bearing down appropriately 100% of the time for  improved bowel function and decreased stress on adjacent pelvic structures. Progressing  - Pt will be able to successfully complete sexual intercourse without pain for improved ADL tolerance. Progressing  - Pt will report ability to tolerate speculum exam without pain for improved access to healthcare. Progressing  - Pt to be educated in pelvic muscle bracing and be able to consistently perform correctly and quickly to help decrease incontinence with cough/laugh/sneeze. MET 3/19/2024  - Pt will be able to correctly and consistently explain urge control strategies to demonstrate understanding of these strategies, decrease likelihood of leakage, and increase time between voids. MET 3/19/2024  - Pt to report improved restorative sleeping, waking up no more than 0-1x/night due to urge to urinate. Progressing  - Pt will report ability to successfully perform double voiding for complete urinary emptying after initial void to prevent immediate return to bathroom. MET 3/19/2024     PLAN      UPDATED Plan of care Certification: 04/30/2024 to 06/04/2024.     Outpatient Physical Therapy 1 time(s) every week for 5 weeks to include the following interventions: Electrical Stimulation TENS/IFC, Manual Therapy, Moist Heat/ Ice, Neuromuscular Re-ed, Patient Education, Self Care, Therapeutic Activities, and Therapeutic Exercise, FDN, and ASTYM.     Ash Phoenix, PT

## 2024-05-08 ENCOUNTER — CLINICAL SUPPORT (OUTPATIENT)
Dept: REHABILITATION | Facility: HOSPITAL | Age: 49
End: 2024-05-08
Payer: COMMERCIAL

## 2024-05-08 ENCOUNTER — DOCUMENTATION ONLY (OUTPATIENT)
Dept: REHABILITATION | Facility: HOSPITAL | Age: 49
End: 2024-05-08

## 2024-05-08 DIAGNOSIS — M62.89 PELVIC FLOOR DYSFUNCTION: Primary | ICD-10-CM

## 2024-05-08 PROCEDURE — 97112 NEUROMUSCULAR REEDUCATION: CPT | Mod: PN,CQ

## 2024-05-08 PROCEDURE — 97530 THERAPEUTIC ACTIVITIES: CPT | Mod: PN,CQ

## 2024-05-08 NOTE — PROGRESS NOTES
Pelvic Health Physical Therapy   Daily Treatment Note     Name: Natty [place]  Clinic Number: [id number]    Therapy Diagnosis:   Encounter Diagnosis   Name Primary?    Pelvic floor dysfunction Yes       Physician: Keira Riggins MD    Visit Date: 5/8/2024    Physician Orders: PT Eval and Treat   Medical Diagnosis from Referral: Stress incontinence in female [N39.3], [place]-Walker grade 2 cystocele [N81.10]   Evaluation Date: 11/30/2023  Authorization Period Expiration: 12/01/2024  Plan of Care Expiration: 06/04/2024  Progress Note Due: ---  Visit # / Visits authorized: 8/20 ; 2/20 + eval   FOTO: Issued Visit #: 2 /3      Precautions: Standard    Time In: 9:45 AM  Time Out: 10:40 AM  Total Billable Time: 55 minutes    Precautions: Standard    Subjective     Pt reports: she has still been having leakage with coughing not as bad as it was last week. She has to get blood work done today to check estrogen levels    She was compliant with home exercise program.   Response to previous treatment: no adverse symptoms   Functional change: increased stress urinary incontinence    Pain: 5/10    Location: low back    Objective     Objective Measures updated at progress report unless specified.     Treatment     NATTY received therapeutic exercises to develop  strength, endurance, ROM, flexibility, and core stabilization for 00  minutes including:     Posterior pelvic tilts   Standing hip 3-way YTB x10 ea.   Piriformis stretch   Alex pose stretch     NATTY participated in neuromuscular re-education activities to develop Coordination, Control, Down training, Proprioception, and Sense for 45 minutes including:     Bear lifts x5  Side lying leg lifts 2x10  Bridges with ball squeeze 2x10  Sidelying Kegels - improvements in decreasing TA activation with Kegel  Supine Kegels - cues for lowering belly during exhale   Seated hip adduction + Kegel 2x10  Seated Kegel (cues for pre-activation) with hands pushing into ball 2x10  Seated  "Kegel while sitting on towel roll   Standing heel raises 2x10    NATTY participated in dynamic functional therapeutic activities to improve functional performance for 10 minutes, including:    Education on hormonal affects to pelvic floor and tissue irritation  [place] carry 15# 1 lap ea.  STS 20" box 15# KB 2x8  Dead lift 15# KB 2x5    Not today:  SLED pushes 2 laps - cues for posture and body mechanics  Additional HEP: 7-way hip   Bridges with kegel + TA co contraction x10  Sit to stand with PFM + TA contraction x8  Kegels with load transfer box + 10# ball added  SLED pushes with Kegel hold x 1 lap       Home Exercises Provided and Patient Education Provided     Education provided:   - bladder irritants, anatomy/physiology of pelvic floor, posture/body mechanices, bladder retraining, diaphragmatic breathing, double voiding techniques, isometric abdominal exercises, kegels, proper bearing down techniques, fluid intake/dietary modifications, and Coordination of kegels with functional activities such as cough, laugh, sneeze, lift, etc.   Discussed progression of plan of care with patient; educated pt in activity modification; reviewed HEP with pt. Pt demonstrated and verbalized understanding of all instruction and was provided with a handout of HEP (see Patient Instructions).    Written Home Exercises Provided: Patient instructed to cont prior HEP.  Exercises were reviewed and NATTY was able to demonstrate them prior to the end of the session.  NATTY demonstrated good  understanding of the education provided.     See EMR under Patient Instructions for exercises provided 12/12/2023 and prior visit.    Assessment     Natty presents to therapy with less urinary leakage with coughing although still present. She reports vaginal dryness and redness was noted on assessment so we discussed moisturizer to address vaginal atrophy and dryness which she is going get blood work for today. She focused on pelvic floor " strengthening and improving coordination of pelvic floor with proper breathing technique.  Patient will continue benefiting from skilled physical therapy to improve pelvic floor strength, endurance, coordination, and proprioception.     NATTY Is progressing well towards her goals.   Pt prognosis is Good.     Pt will continue to benefit from skilled outpatient physical therapy to address the deficits listed in the problem list box on initial evaluation, provide pt/family education and to maximize pt's level of independence in the home and community environment.     Pt's spiritual, cultural and educational needs considered and pt agreeable to plan of care and goals.     Anticipated barriers to physical therapy: none    Goals:  Short Term Goals: 6 weeks    - Pt will demonstrate excellent knowledge and adherence to HEP to facilitate optimal recovery. MET 3/19/2024  - Pt will demonstrate proper PFM contraction, relaxation, and lengthening coordinated with TA and breath for improved muscle coordination needed for functional activity. MET 3/19/2024  - Pt will demonstrate proper breathing technique to help with calming the nervous system in order to improve abdominal wall and PF musculature extensibility for decreased pain, improved voiding ability, and improved QOL. MET 3/19/2024  - Pt to increase pelvic floor strength to at least 2/5 to demonstrate improved strength needed for continence with ADLs. Progressing  - Pt will report no incidence of urinary incontinence 4/7 days for improved hygiene and ADL/IADL tolerance. MET 3/19/2024    Long Term Goals: 10 weeks     - Pt will demonstrate excellent knowledge and adherence to HEP for continued self-maintenance of symptoms. MET 3/19/2024  - Pt will report PFDI Urianry FOTO score of 0% limited indicating clinically relevant increase in function. MET 3/19/2024  - Pt will report voiding interval of 2-3 hours for improved ADL tolerance. MET 3/19/2024  - Pt will report no incidence  of urinary incontinence 6/7 days for improved hygiene and ADL/IADL tolerance. MET 3/19/2024  - Pt will demonstrate PFM strength of at least 3/5 MMT for improved strength needed to maintain continence. Progressing  - Pt to be able to perform a 3 second kegel x 10 reps with good quality to demonstrate improving strength and endurance needed for continence.Progressing  - Pt will report bearing down appropriately 100% of the time for improved bowel function and decreased stress on adjacent pelvic structures. Progressing  - Pt will be able to successfully complete sexual intercourse without pain for improved ADL tolerance. Progressing  - Pt will report ability to tolerate speculum exam without pain for improved access to healthcare. Progressing  - Pt to be educated in pelvic muscle bracing and be able to consistently perform correctly and quickly to help decrease incontinence with cough/laugh/sneeze. MET 3/19/2024  - Pt will be able to correctly and consistently explain urge control strategies to demonstrate understanding of these strategies, decrease likelihood of leakage, and increase time between voids. MET 3/19/2024  - Pt to report improved restorative sleeping, waking up no more than 0-1x/night due to urge to urinate. Progressing  - Pt will report ability to successfully perform double voiding for complete urinary emptying after initial void to prevent immediate return to bathroom. MET 3/19/2024     PLAN      UPDATED Plan of care Certification: 04/30/2024 to 06/04/2024.     Outpatient Physical Therapy 1 time(s) every week for 5 weeks to include the following interventions: Electrical Stimulation TENS/IFC, Manual Therapy, Moist Heat/ Ice, Neuromuscular Re-ed, Patient Education, Self Care, Therapeutic Activities, and Therapeutic Exercise, FDN, and ASTYM.     Maryan Quezada, PTA

## 2024-05-14 NOTE — PROGRESS NOTES
"  Pelvic Health Physical Therapy   Daily Treatment Note     Name: Natty [place]  Clinic Number: [id number]    Therapy Diagnosis:   Encounter Diagnosis   Name Primary?    Pelvic floor dysfunction Yes         Physician: Keira Riggins MD    Visit Date: 5/15/2024    Physician Orders: PT Eval and Treat   Medical Diagnosis from Referral: Stress incontinence in female [N39.3], [place]-Walker grade 2 cystocele [N81.10]   Evaluation Date: 11/30/2023  Authorization Period Expiration: 12/01/2024  Plan of Care Expiration: 06/04/2024  Progress Note Due: ---  Visit # / Visits authorized: 9/20 ; 2/20 + eval   FOTO: Issued Visit #: 2 /3      Precautions: Standard    Time In: 9:45 AM  Time Out: 10:30 AM  Total Billable Time: 45  minutes    Precautions: Standard    Subjective     Pt reports: continued leakage with cough/sneeze/laugh. Has also noticed an increased in urge incontinence when holding bladder for too long.     She was compliant with home exercise program.   Response to previous treatment: no adverse symptoms   Functional change: increased stress urinary incontinence      Pain: 5/10     Location: low back    Objective     Objective Measures updated at progress report unless specified.     Treatment     NATTY received therapeutic exercises to develop  strength, endurance, ROM, flexibility, and core stabilization for 00  minutes including:     Posterior pelvic tilts   Standing hip 3-way YTB x10 ea.   Piriformis stretch   Alex pose stretch     NATTY participated in neuromuscular re-education activities to develop Coordination, Control, Down training, Proprioception, and Sense for 37 minutes including:     Bear lifts x5  Side lying leg lifts 2x10  SL reverse clamshells 3x10  Bridges with ball squeeze 2x15  Quadruped TA contraction 15 reps   Cat cow with Diaphragmatic breathing 15 reps   Bird dog alternating legs 5 reps each - cues for ppt and decreasing pelvic rotation   Table top hold 20", 2x30"  [place] press 30# 2x15 " "    Not today:  Sidelying Kegels - improvements in decreasing TA activation with Kegel  Supine Kegels - cues for lowering belly during exhale   Seated hip adduction + Kegel 2x10  Seated Kegel (cues for pre-activation) with hands pushing into ball 2x10  Seated Kegel while sitting on towel roll   Standing heel raises 2x10    NATTY participated in dynamic functional therapeutic activities to improve functional performance for 08 minutes, including:    Education on bladder retraining and voiding every 2-4 hours to decrease incidents of urge incontinence  [place] carry 15# 1 lap ea.  STS 20" box 15# KB 2x10    Not today:  Dead lift 15# KB 2x5    Not today:  SLED pushes 2 laps - cues for posture and body mechanics  Additional HEP: 7-way hip   Bridges with kegel + TA co contraction x10  Sit to stand with PFM + TA contraction x8  Kegels with load transfer box + 10# ball added  SLED pushes with Kegel hold x 1 lap       Home Exercises Provided and Patient Education Provided     Education provided:   - bladder irritants, anatomy/physiology of pelvic floor, posture/body mechanices, bladder retraining, diaphragmatic breathing, double voiding techniques, isometric abdominal exercises, kegels, proper bearing down techniques, fluid intake/dietary modifications, and Coordination of kegels with functional activities such as cough, laugh, sneeze, lift, etc.   Discussed progression of plan of care with patient; educated pt in activity modification; reviewed HEP with pt. Pt demonstrated and verbalized understanding of all instruction and was provided with a handout of HEP (see Patient Instructions      Written Home Exercises Provided: Patient instructed to cont prior HEP.  Exercises were reviewed and NATTY was able to demonstrate them prior to the end of the session.  NATTY demonstrated good  understanding of the education provided.     See EMR under Patient Instructions for exercises provided 12/12/2023 and prior " visit.    Assessment     Natty tolerated therapy session well with minimal to no complaints of pain. Continued focus on progressing core and hip strength as tolerated. Recommended continuing Kegel HEP at home to strengthen pelvic floor musculature. Education provided on bladder retraining and voiding schedule of every 2-4 hours.   Patient will continue benefiting from skilled physical therapy to improve pelvic floor strength, endurance, coordination, and proprioception.     NATTY Is progressing well towards her goals.   Pt prognosis is Good.     Pt will continue to benefit from skilled outpatient physical therapy to address the deficits listed in the problem list box on initial evaluation, provide pt/family education and to maximize pt's level of independence in the home and community environment.     Pt's spiritual, cultural and educational needs considered and pt agreeable to plan of care and goals.     Anticipated barriers to physical therapy: none    Goals:  Short Term Goals: 6 weeks    - Pt will demonstrate excellent knowledge and adherence to HEP to facilitate optimal recovery. MET 3/19/2024  - Pt will demonstrate proper PFM contraction, relaxation, and lengthening coordinated with TA and breath for improved muscle coordination needed for functional activity. MET 3/19/2024  - Pt will demonstrate proper breathing technique to help with calming the nervous system in order to improve abdominal wall and PF musculature extensibility for decreased pain, improved voiding ability, and improved QOL. MET 3/19/2024  - Pt to increase pelvic floor strength to at least 2/5 to demonstrate improved strength needed for continence with ADLs. Progressing  - Pt will report no incidence of urinary incontinence 4/7 days for improved hygiene and ADL/IADL tolerance. MET 3/19/2024    Long Term Goals: 10 weeks     - Pt will demonstrate excellent knowledge and adherence to HEP for continued self-maintenance of symptoms. MET  3/19/2024  - Pt will report PFDI Urianry FOTO score of 0% limited indicating clinically relevant increase in function. MET 3/19/2024  - Pt will report voiding interval of 2-3 hours for improved ADL tolerance. MET 3/19/2024  - Pt will report no incidence of urinary incontinence 6/7 days for improved hygiene and ADL/IADL tolerance. MET 3/19/2024  - Pt will demonstrate PFM strength of at least 3/5 MMT for improved strength needed to maintain continence. Progressing  - Pt to be able to perform a 3 second kegel x 10 reps with good quality to demonstrate improving strength and endurance needed for continence.Progressing  - Pt will report bearing down appropriately 100% of the time for improved bowel function and decreased stress on adjacent pelvic structures. Progressing  - Pt will be able to successfully complete sexual intercourse without pain for improved ADL tolerance. Progressing  - Pt will report ability to tolerate speculum exam without pain for improved access to healthcare. Progressing  - Pt to be educated in pelvic muscle bracing and be able to consistently perform correctly and quickly to help decrease incontinence with cough/laugh/sneeze. MET 3/19/2024  - Pt will be able to correctly and consistently explain urge control strategies to demonstrate understanding of these strategies, decrease likelihood of leakage, and increase time between voids. MET 3/19/2024  - Pt to report improved restorative sleeping, waking up no more than 0-1x/night due to urge to urinate. Progressing  - Pt will report ability to successfully perform double voiding for complete urinary emptying after initial void to prevent immediate return to bathroom. MET 3/19/2024     PLAN      UPDATED Plan of care Certification: 04/30/2024 to 06/04/2024.     Outpatient Physical Therapy 1 time(s) every week for 5 weeks to include the following interventions: Electrical Stimulation TENS/IFC, Manual Therapy, Moist Heat/ Ice, Neuromuscular Re-ed, Patient  Education, Self Care, Therapeutic Activities, and Therapeutic Exercise, FDN, and ASTYM.     Ash Phoenix, PT

## 2024-05-15 ENCOUNTER — CLINICAL SUPPORT (OUTPATIENT)
Dept: REHABILITATION | Facility: HOSPITAL | Age: 49
End: 2024-05-15
Payer: COMMERCIAL

## 2024-05-15 ENCOUNTER — PATIENT MESSAGE (OUTPATIENT)
Dept: UROLOGY | Facility: CLINIC | Age: 49
End: 2024-05-15
Payer: COMMERCIAL

## 2024-05-15 DIAGNOSIS — M62.89 PELVIC FLOOR DYSFUNCTION: Primary | ICD-10-CM

## 2024-05-15 PROCEDURE — 97530 THERAPEUTIC ACTIVITIES: CPT | Mod: PN

## 2024-05-15 PROCEDURE — 97112 NEUROMUSCULAR REEDUCATION: CPT | Mod: PN

## 2024-05-15 NOTE — PATIENT INSTRUCTIONS
BLADDER RETRAINING    The goal of bladder retraining is to return you to a more normal and convenient pattern of urinating. People who experience urinary urgency, frequency, excessive nighttime urinating and urinary leakage can show improvement with this retraining technique. Bladder retraining helps restore your bladder capacity to normal. The program includes education about bladder function, urge control, record keeping, and following a schedule of urinating (voluntarily emptying your bladder). The success of the program depends on your effort to consistently keep a specific schedule and to have follow-up appointments with your health care provider.    THE RETRAINING PROGRAM  Each morning upon arising, go to the toilet and completely empty your bladder.  Your voiding schedule will begin upon getting out of bed and end at bedtime.  Your voiding schedule is every 2-4 hours.  Follow this interval as closely as possible. The important part of the retraining is that you practice telling your bladder when to empty and when to hold.  Go to the toilet at the scheduled time even if you do not feel the need to urinate. The amount you urinate is not important. It is important to relax and not strain or push while voiding.  If you feel the need to urinate before the scheduled time, use urge delay techniques.  If you have to interrupt the schedule, get back on schedule at the assigned time for the next void.    RECORDING ON THE VOIDING LOG  Record your urination in the voiding log (in the amount voided section) by placing a check corresponding to the time of day.    If leakage of urine occurs, record the time and amount in amount of leakage column with small, medium or large corresponding to the hour this occurs.  Do not be discouraged if leakage occurs. Note the next scheduled time for voiding and set a timer to keep on schedule.    PROGRESSING THE PROGRAM  The goal is to go 3-4 hours between urinating.  You will change  [facility]        NAME: Anup Li is a 62 y o  male  : 1961    MRN: [id number]  DATE: September 3, 2020  TIME: 8:49 AM    Assessment and Plan   Gout, unspecified cause, unspecified chronicity, unspecified site [M10 9]  1  Gout, unspecified cause, unspecified chronicity, unspecified site  allopurinol (ZYLOPRIM) 300 mg tablet   2  Congestive heart failure, unspecified HF chronicity, unspecified heart failure type (HCC)  atorvastatin (LIPITOR) 10 mg tablet    allopurinol (ZYLOPRIM) 300 mg tablet    nicotine (NICODERM CQ) 21 mg/24 hr TD 24 hr patch   3  Type 2 diabetes mellitus without complication, without long-term current use of insulin ([facility])  metFORMIN (GLUCOPHAGE) 500 mg tablet   4  Controlled type 2 diabetes mellitus without complication, without long-term current use of insulin ([facility] )     5  Chronic obstructive pulmonary disease, unspecified COPD type ([facility] [address] )  fluticasone-salmeterol (ADVAIR, WIXELA) 250-50 mcg/dose inhaler   6  Chronic atrial fibrillation [facility])           Patient Instructions     Patient Instructions   Ck labs          Chief Complaint     Chief Complaint   Patient presents with    Multiple Concerns     Quit smoking, COPD issues         History of Present Illness       F/u assessed med cond--COPD worse      Review of Systems   Review of Systems   Constitutional: Negative for fatigue, fever and unexpected weight change  HENT: Negative for congestion, sinus pain and sore throat  Eyes: Negative for visual disturbance  Respiratory: Negative for shortness of breath and wheezing  Cardiovascular: Negative for chest pain and palpitations  Gastrointestinal: Negative for abdominal pain, nausea and vomiting  Musculoskeletal: Negative  Negative for arthralgias and myalgias  Neurological: Negative for syncope, weakness and numbness  Psychiatric/Behavioral: Negative  Negative for confusion, dysphoric mood and suicidal ideas           Current Medications "the time between urination by minute/hour intervals.    Your daily log will help with recording progress and determining the next retraining interval.  Be sure to bring the log to all your appointments.      TIPS FOR CONTROLLING THE URGE TO URINATE  Perform some quick pelvic floor contractions to suppress the urge.  Mental distraction techniques including visualization of your favorite vacation spot, counting backwards, deep breathing or positive self-thoughts, for example, I can control my bladder will help control the urge.  Pressure to the perineal area helps control the urge.  Place your hand or a rolled up towel against the crotch of your underwear and apply firm pressure.  Alternatively, sit on a rolled up towel placed on a firm chair.  Never rush or run to get to the toilet when it's time to go.  Always feel "in control" when you stand up to go to the toilet.    TIPS FOR SUCCESS  Avoid foods and beverages that irritate your bladder.  See the handout How Diet May Affect Your Bladder for a bladder irritant list.  Drink at least 4-8 glasses (8 ounces) of water each day.   Maintain regular bowel habits.  If you are constipated, add fiber to your diet.    DEALING WITH PROBLEMS AND SET BACKS  Setbacks are not uncommon if you have been ill with a cold or flu, are tired, cannot completely concentrate on the program, feel nervous or tense, are sensitive to cold weather or the sound of running water, or are about to start your menstrual period.    If all of the above techniques fail and you still have to an overwhelming urge to go, you may use the toilet.  If this occurs, record in the amount urinated column as well as in the "activity with leakage and was urge present" columns the hour that this urge and voiding occurred.    The urge feeling needs to be suppressed on a consistent basis; be patient and stick with the program. Before you begin, decide what type of strategy will work for you and use it faithfully.   " Current Outpatient Medications:     allopurinol (ZYLOPRIM) 300 mg tablet, Take 1 tablet (300 mg total) by mouth daily, Disp: 90 tablet, Rfl: 1    digoxin (DIGOX) 0 25 mg tablet, Take 1 tablet (250 mcg total) by mouth daily, Disp: 90 tablet, Rfl: 3    ELIQUIS 5 MG, , Disp: , Rfl: 0    ENTRESTO  MG TABS, , Disp: , Rfl:     furosemide (LASIX) 20 mg tablet, Take 1 tablet by mouth daily as needed, Disp: , Rfl:     HYDROcodone-acetaminophen (NORCO)  mg per tablet, Take 1 tablet by mouth every 6 (six) hours as needed for moderate painMax Daily Amount: 4 tablets, Disp: 120 tablet, Rfl: 0    lisinopril (ZESTRIL) 5 mg tablet, Take 1 tablet (5 mg total) by mouth daily, Disp: 90 tablet, Rfl: 3    metFORMIN (GLUCOPHAGE) 500 mg tablet, Take 1 tablet (500 mg total) by mouth 2 (two) times a day, Disp: 180 tablet, Rfl: 3    metoprolol succinate (TOPROL-XL) 50 mg 24 hr tablet, , Disp: , Rfl: 1    VENTOLIN  (90 Base) MCG/ACT inhaler, Inhale 1 puff every 6 (six) hours as needed for wheezing, Disp: 18 g, Rfl: 5    aspirin 81 MG tablet, Take by mouth, Disp: , Rfl:     atorvastatin (LIPITOR) 10 mg tablet, Take 1 tablet (10 mg total) by mouth daily, Disp: 90 tablet, Rfl: 3    budesonide-formoterol (SYMBICORT) 160-4 5 mcg/act inhaler, Inhale 2 puffs 2 (two) times a day Rinse mouth after use   (Patient not taking: Reported on 9/3/2020), Disp: 1 Inhaler, Rfl: 10    DIGITEK 125 MCG tablet, , Disp: , Rfl: 1    diphenhydrAMINE (BENADRYL) 25 mg tablet, Take 1 tablet (25 mg total) by mouth every 6 (six) hours as needed for itching (Patient not taking: Reported on 9/3/2020), Disp: 20 tablet, Rfl: 0    EPINEPHrine (EPIPEN) 0 3 mg/0 3 mL SOAJ, Inject 0 3 mL (0 3 mg total) into a muscle once for 1 dose, Disp: 0 6 mL, Rfl: 0    fluticasone (FLONASE) 50 mcg/act nasal spray, 2 sprays into each nostril daily, Disp: , Rfl:     fluticasone-salmeterol (ADVAIR, WIXELA) 250-50 mcg/dose inhaler, Inhale 1 puff 2 (two) times a day Rinse mouth after use , Disp: 1 each, Rfl: 10    nicotine (NICODERM CQ) 21 mg/24 hr TD 24 hr patch, Place 1 patch on the skin every 24 hours, Disp: 28 patch, Rfl: 2    omeprazole (PriLOSEC) 20 mg delayed release capsule, Take 1 capsule by mouth daily, Disp: , Rfl:     Current Allergies     Allergies as of 09/03/2020 - Reviewed 09/03/2020   Allergen Reaction Noted    Penicillins Anaphylaxis 11/06/2015    Meloxicam  11/06/2015            The following portions of the patient's history were reviewed and updated as appropriate: allergies, current medications, past family history, past medical history, past social history, past surgical history and problem list      Past Medical History:   Diagnosis Date    [facility] [address] [facility]     [facility]     COPD (chronic obstructive [facility] disease) ([facility]     Coronary artery disease        Past Surgical History:   Procedure Laterality Date    CARDIAC ICD GENERATOR CHANGE      NO PAST SURGERIES         Family History   Problem Relation Age of Onset    Hypertension Mother     Substance Abuse Neg Hx     Mental illness Neg Hx          Medications have been verified  Objective   /88   Pulse 88   Temp 98 1 °F (36 7 °C)   Resp 16   Ht 5' 9" (1 753 m)   Wt 99 3 kg (219 lb)   SpO2 90%   BMI 32 34 kg/m²        Physical Exam     Physical Exam  Constitutional:       Appearance: He is well-developed  HENT:      Right Ear: Ear canal normal  Tympanic membrane is not injected  Left Ear: Ear canal normal  Tympanic membrane is not injected  Nose: Nose normal    Eyes:      General:         Right eye: No discharge  Left eye: No discharge  Conjunctiva/sclera: Conjunctivae normal       Pupils: Pupils are equal, round, and reactive to light  Neck:      Musculoskeletal: Normal range of motion and neck supple  Thyroid: No thyromegaly     Cardiovascular:      Rate and Rhythm: Normal rate and regular rhythm  Heart sounds: Normal heart sounds  No murmur  Pulmonary:      Effort: Pulmonary effort is normal  No respiratory distress  Breath sounds: Normal breath sounds  No wheezing  Abdominal:      General: Bowel sounds are normal  There is no distension  Palpations: Abdomen is soft  Tenderness: There is no abdominal tenderness  Musculoskeletal: Normal range of motion  Feet:      Right foot:      Skin integrity: No ulcer, skin breakdown, erythema, warmth, callus or dry skin  Left foot:      Skin integrity: No ulcer, skin breakdown, erythema, warmth, callus or dry skin  Lymphadenopathy:      Cervical: No cervical adenopathy  Skin:     General: Skin is warm and dry  Neurological:      Mental Status: He is alert and oriented to person, place, and time  He is not disoriented  Sensory: No sensory deficit  Gait: Gait normal       Deep Tendon Reflexes: Reflexes are normal and symmetric  Psychiatric:         Speech: Speech normal          Behavior: Behavior normal          Thought Content:  Thought content normal          Judgment: Judgment normal            Right Foot/Ankle   Right Foot Inspection  Skin Exam: skin normal and skin intact no dry skin, no warmth, no callus, no erythema, no maceration, no abnormal color, no pre-ulcer, no ulcer and no callus                                  Left Foot/Ankle  Left Foot Inspection  Skin Exam: skin normal and skin intactno dry skin, no warmth, no erythema, no maceration, normal color, no pre-ulcer, no ulcer and no callus                                             Assign Risk Category:  ; ;        Risk: 0

## 2024-05-20 ENCOUNTER — TELEPHONE (OUTPATIENT)
Dept: UROLOGY | Facility: CLINIC | Age: 49
End: 2024-05-20
Payer: COMMERCIAL

## 2024-05-20 NOTE — TELEPHONE ENCOUNTER
Contacted patient to assist in scheduling a follow up appointment with Dr. Riggins due to constant urinary leakage and frequency. Patient states she will call at a later time to schedule follow up appointment.

## 2024-05-29 ENCOUNTER — CLINICAL SUPPORT (OUTPATIENT)
Dept: REHABILITATION | Facility: HOSPITAL | Age: 49
End: 2024-05-29
Payer: COMMERCIAL

## 2024-05-29 DIAGNOSIS — M62.89 PELVIC FLOOR DYSFUNCTION: Primary | ICD-10-CM

## 2024-05-29 PROCEDURE — 97112 NEUROMUSCULAR REEDUCATION: CPT | Mod: PN

## 2024-05-29 NOTE — PROGRESS NOTES
"  Pelvic Health Physical Therapy   Daily Treatment Note     Name: Natty Ewen  Clinic Number: [id number]    Therapy Diagnosis:   Encounter Diagnosis   Name Primary?    Pelvic floor dysfunction Yes         Physician: Keira Riggins MD    Visit Date: 5/29/2024    Physician Orders: PT Eval and Treat   Medical Diagnosis from Referral: Stress incontinence in female [N39.3], Herald-Walker grade 2 cystocele [N81.10]   Evaluation Date: 11/30/2023  Authorization Period Expiration: 12/01/2024  Plan of Care Expiration: 06/04/2024  Progress Note Due: ---  Visit # / Visits authorized: 10/20 ; 2/20 + eval   FOTO: Issued Visit #: 2 /3      Precautions: Standard    Time In: 9:45 AM  Time Out: 10:30 AM  Total Billable Time: 45 minutes    Precautions: Standard    Subjective     Pt reports: notices leakage once or twice a week.     She was compliant with home exercise program.   Response to previous treatment: no adverse symptoms   Functional change: increased stress urinary incontinence      Pain: 3/10     Location: low back    Objective     Objective Measures updated at progress report unless specified.     Treatment     NATTY received therapeutic exercises to develop  strength, endurance, ROM, flexibility, and core stabilization for 00  minutes including:     Posterior pelvic tilts   Standing hip 3-way YTB x10 ea.   Piriformis stretch   Alex pose stretch     NATTY participated in neuromuscular re-education activities to develop Coordination, Control, Down training, Proprioception, and Sense for 45 minutes including:     Bear lifts x5  7-way hip   SL reverse clamshells 3x10  Bridges on ball 2x10 - cues for TA contraction, breathing, and ppt  Quadruped TA contraction 20 reps    Bird dog alternating legs 5 reps each - cues for ppt and decreasing pelvic rotation   Table top hold 20", 2x30"  Paloff press 30# 2x15 - notices right shoulder weakness  D2 flexion/extension at cables 10# - cues for TA contraction     NATTY participated " "in dynamic functional therapeutic activities to improve functional performance for 00 minutes, including:    Education on bladder retraining and voiding every 2-4 hours to decrease incidents of urge incontinence  Tunis carry 15# 1 lap ea.  STS 20" box 15# KB 2x10    Not today:  Dead lift 15# KB 2x5    Not today:  SLED pushes 2 laps - cues for posture and body mechanics  Additional HEP: 7-way hip   Bridges with kegel + TA co contraction x10  Sit to stand with PFM + TA contraction x8  Kegels with load transfer box + 10# ball added  SLED pushes with Kegel hold x 1 lap       Home Exercises Provided and Patient Education Provided     Education provided:   - bladder irritants, anatomy/physiology of pelvic floor, posture/body mechanices, bladder retraining, diaphragmatic breathing, double voiding techniques, isometric abdominal exercises, kegels, proper bearing down techniques, fluid intake/dietary modifications, and Coordination of kegels with functional activities such as cough, laugh, sneeze, lift, etc.   Discussed progression of plan of care with patient; educated pt in activity modification; reviewed HEP with pt. Pt demonstrated and verbalized understanding of all instruction and was provided with a handout of HEP (see Patient Instructions      Written Home Exercises Provided: Patient instructed to cont prior HEP.  Exercises were reviewed and NATTY was able to demonstrate them prior to the end of the session.  NATTY demonstrated good  understanding of the education provided.     See EMR under Patient Instructions for exercises provided 12/12/2023 and prior visit.    Assessment     Natty presents to therapy today with reports of slight improvement in urinary incontinence. Provided patient with extensive HEP for hip and core strengthening while patient is out for about a month. Recommended patient be placed back on schedule once back in town if any symptoms has worsened.     Patient will continue benefiting from " skilled physical therapy to improve pelvic floor strength, endurance, coordination, and proprioception.     NATTY Is progressing well towards her goals.   Pt prognosis is Good.     Pt will continue to benefit from skilled outpatient physical therapy to address the deficits listed in the problem list box on initial evaluation, provide pt/family education and to maximize pt's level of independence in the home and community environment.     Pt's spiritual, cultural and educational needs considered and pt agreeable to plan of care and goals.     Anticipated barriers to physical therapy: none    Goals:  Short Term Goals: 6 weeks    - Pt will demonstrate excellent knowledge and adherence to HEP to facilitate optimal recovery. MET 3/19/2024  - Pt will demonstrate proper PFM contraction, relaxation, and lengthening coordinated with TA and breath for improved muscle coordination needed for functional activity. MET 3/19/2024  - Pt will demonstrate proper breathing technique to help with calming the nervous system in order to improve abdominal wall and PF musculature extensibility for decreased pain, improved voiding ability, and improved QOL. MET 3/19/2024  - Pt to increase pelvic floor strength to at least 2/5 to demonstrate improved strength needed for continence with ADLs. Progressing  - Pt will report no incidence of urinary incontinence 4/7 days for improved hygiene and ADL/IADL tolerance. MET 3/19/2024    Long Term Goals: 10 weeks     - Pt will demonstrate excellent knowledge and adherence to HEP for continued self-maintenance of symptoms. MET 3/19/2024  - Pt will report PFDI Urianry FOTO score of 0% limited indicating clinically relevant increase in function. MET 3/19/2024  - Pt will report voiding interval of 2-3 hours for improved ADL tolerance. MET 3/19/2024  - Pt will report no incidence of urinary incontinence 6/7 days for improved hygiene and ADL/IADL tolerance. MET 3/19/2024  - Pt will demonstrate PFM strength  of at least 3/5 MMT for improved strength needed to maintain continence. Progressing  - Pt to be able to perform a 3 second kegel x 10 reps with good quality to demonstrate improving strength and endurance needed for continence.MET 5/29/2024  - Pt will report bearing down appropriately 100% of the time for improved bowel function and decreased stress on adjacent pelvic structures. MET 5/29/2024  - Pt will be able to successfully complete sexual intercourse without pain for improved ADL tolerance. MET 5/29/2024  - Pt will report ability to tolerate speculum exam without pain for improved access to healthcare. MET 5/29/2024  - Pt to be educated in pelvic muscle bracing and be able to consistently perform correctly and quickly to help decrease incontinence with cough/laugh/sneeze. MET 3/19/2024  - Pt will be able to correctly and consistently explain urge control strategies to demonstrate understanding of these strategies, decrease likelihood of leakage, and increase time between voids. MET 3/19/2024  - Pt to report improved restorative sleeping, waking up no more than 0-1x/night due to urge to urinate. MET 5/29/2024  - Pt will report ability to successfully perform double voiding for complete urinary emptying after initial void to prevent immediate return to bathroom. MET 3/19/2024     PLAN      UPDATED Plan of care Certification: 04/30/2024 to 06/04/2024.     Outpatient Physical Therapy 1 time(s) every week for 5 weeks to include the following interventions: Electrical Stimulation TENS/IFC, Manual Therapy, Moist Heat/ Ice, Neuromuscular Re-ed, Patient Education, Self Care, Therapeutic Activities, and Therapeutic Exercise, FDN, and ASTYM.     Ash Phoenix, PT

## 2024-10-14 ENCOUNTER — OFFICE VISIT (OUTPATIENT)
Dept: UROLOGY | Facility: CLINIC | Age: 49
End: 2024-10-14
Payer: COMMERCIAL

## 2024-10-14 VITALS
BODY MASS INDEX: 25.83 KG/M2 | TEMPERATURE: 98 F | HEART RATE: 71 BPM | HEIGHT: 66 IN | SYSTOLIC BLOOD PRESSURE: 127 MMHG | WEIGHT: 160.69 LBS | RESPIRATION RATE: 17 BRPM | DIASTOLIC BLOOD PRESSURE: 78 MMHG

## 2024-10-14 DIAGNOSIS — N39.3 STRESS INCONTINENCE IN FEMALE: Primary | ICD-10-CM

## 2024-10-14 DIAGNOSIS — N32.81 OAB (OVERACTIVE BLADDER): ICD-10-CM

## 2024-10-14 DIAGNOSIS — N89.8 VAGINAL ITCHING: ICD-10-CM

## 2024-10-14 LAB
BILIRUB UR QL STRIP: NEGATIVE
GLUCOSE UR QL STRIP: NEGATIVE
KETONES UR QL STRIP: NEGATIVE
LEUKOCYTE ESTERASE UR QL STRIP: POSITIVE
PH, POC UA: 7
POC BLOOD, URINE: NEGATIVE
POC NITRATES, URINE: NEGATIVE
POC RESIDUAL URINE VOLUME: 17 ML (ref 0–100)
PROT UR QL STRIP: NEGATIVE
SP GR UR STRIP: 1.01 (ref 1–1.03)
UROBILINOGEN UR STRIP-ACNC: 0.2 (ref 0.1–1.1)

## 2024-10-14 PROCEDURE — 1160F RVW MEDS BY RX/DR IN RCRD: CPT | Mod: CPTII,S$GLB,, | Performed by: UROLOGY

## 2024-10-14 PROCEDURE — 81003 URINALYSIS AUTO W/O SCOPE: CPT | Mod: QW,S$GLB,, | Performed by: UROLOGY

## 2024-10-14 PROCEDURE — 99999 PR PBB SHADOW E&M-EST. PATIENT-LVL V: CPT | Mod: PBBFAC,,, | Performed by: UROLOGY

## 2024-10-14 PROCEDURE — 3074F SYST BP LT 130 MM HG: CPT | Mod: CPTII,S$GLB,, | Performed by: UROLOGY

## 2024-10-14 PROCEDURE — 1159F MED LIST DOCD IN RCRD: CPT | Mod: CPTII,S$GLB,, | Performed by: UROLOGY

## 2024-10-14 PROCEDURE — 3078F DIAST BP <80 MM HG: CPT | Mod: CPTII,S$GLB,, | Performed by: UROLOGY

## 2024-10-14 PROCEDURE — 99214 OFFICE O/P EST MOD 30 MIN: CPT | Mod: S$GLB,,, | Performed by: UROLOGY

## 2024-10-14 PROCEDURE — 3008F BODY MASS INDEX DOCD: CPT | Mod: CPTII,S$GLB,, | Performed by: UROLOGY

## 2024-10-14 PROCEDURE — 3044F HG A1C LEVEL LT 7.0%: CPT | Mod: CPTII,S$GLB,, | Performed by: UROLOGY

## 2024-10-14 PROCEDURE — 51798 US URINE CAPACITY MEASURE: CPT | Mod: S$GLB,,, | Performed by: UROLOGY

## 2024-10-14 RX ORDER — FLUCONAZOLE 150 MG/1
150 TABLET ORAL
Qty: 3 TABLET | Refills: 0 | Status: SHIPPED | OUTPATIENT
Start: 2024-10-14 | End: 2024-10-21

## 2024-10-14 RX ORDER — LEVOTHYROXINE SODIUM 125 UG/1
125 TABLET ORAL EVERY MORNING
COMMUNITY

## 2024-10-14 NOTE — PROGRESS NOTES
Chief Complaint   Patient presents with    Follow-up     6 month        History of Present Illness:   Arlyn Vaughan is a 49 y.o. female here for evaluation of Follow-up (6 month )    10/14/24-Still having occasional JOSE with sneezing. Nothing with squatting or other exercise, but she hasn't been as active as usual at the gym. She hasn't noticed it with normal activity. Having some back issues. Occasional urgency with slight UUI, but no complete incontinence. Last week, she was having some vaginal itching. No discharge.   3/27/24-S/P Bulkamid a few weeks ago. Pt happy with results. No longer wearing liners. Minimal JOSE. No post-op pain or any significant hematuria.   2/19/24-Pt attended pelvic floor PT and is improved, but still having JOSE at times. Wears pads only if she knows she is going to be doing activity. A little UUI. No dysuria/gross hematuria.  11/20/23-47yo female, here for evaluation of urinary incontinence. Pt reports that for the past couple of years, she ahs been having JOSE with sneeze, laugh. Progressively getting worse. Wears a liner, especially when she goes to the gym. She can even leak with jumping, lifting. She reports incontinence even with intercourse. In October, she had her first diagnosed UTI. Symptoms were painful and went on for a week, so she went to urgent care.   She saw Dr. Hunt, who prescribed vesicare, which didn't help with the incontinence, but caused an intermittent urinary stream. She has since stopped it.   She does report some urgency, which may occur as she gets close to the restroom. Sometimes she has UUI on the way.   She struggles with constipation. She was recently diagnosed with IBS. Taking probiotics and drinking Plexus.       Review of Systems   Constitutional:  Positive for fatigue.   Respiratory:  Negative for shortness of breath.    Cardiovascular:  Negative for chest pain.   Gastrointestinal:  Negative for abdominal pain.   Musculoskeletal:  Positive for arthralgias  and back pain.   All other systems reviewed and are negative.        Past Medical History:   Diagnosis Date    Anemia     Arthritis     Breast cyst     Fever blister     Graves disease     Hypothyroidism     Lichen sclerosus     Urinary incontinence 2021    It is getting worse.       Past Surgical History:   Procedure Laterality Date    COLONOSCOPY N/A 02/02/2022    Procedure: COLONOSCOPY;  Surgeon: Fransico Miles III, MD;  Location: [facility] ENDO;  Service: Endoscopy;  Laterality: N/A;    CYSTOSCOPY WITH INJECTION OF PERIURETHRAL BULKING AGENT N/A 3/11/2024    Procedure: CYSTOSCOPY, WITH PERIURETHRAL BULKING AGENT INJECTION;  Surgeon: Keira Riggins MD;  Location: [facility] OR;  Service: Urology;  Laterality: N/A;    SALPINGOOPHORECTOMY Left 2008    SHOULDER SURGERY Right        Family History   Problem Relation Name Age of Onset    Diabetes type II Father Ralph     Migraines Father Ralph     Heart disease Mother Livia     Gallbladder disease Mother Livia     Fibromyalgia Mother Livia     Arrhythmia Mother Livia     Breast cancer Mother Livia     Osteoarthritis Mother Livia     Rashes / Skin problems Mother Livia     Thyroid disease Mother Livia     Gallbladder disease Sister      Rashes / Skin problems Maternal Grandfather Anthony Carrizales     Diabetes Paternal Grandmother Shyann        Social History     Tobacco Use    Smoking status: Never     Passive exposure: Never    Smokeless tobacco: Never   Substance Use Topics    Alcohol use: Never    Drug use: Never       Current Outpatient Medications   Medication Sig Dispense Refill    biotin 10,000 mcg Cap Take by mouth.      busPIRone (BUSPAR) 5 MG Tab Take one tablet once a day      calcium carbonate/vitamin D3 (VITAMIN D-3 ORAL)       cetirizine (ZYRTEC) 10 MG tablet Take 10 mg by mouth once daily.      clobetasoL (TEMOVATE) 0.05 % cream APPLY  CREAM TOPICALLY TO AFFECTED AREA TWICE DAILY FOR 14 DAYS 30 g 0    estradioL (IMVEXXY MAINTENANCE PACK)  4 mcg Inst Place 4 mcg vaginally twice a week. 8 each 6    estradioL (IMVEXXY STARTER PACK) 4 mcg InPk Place 4 mcg vaginally nightly. 18 each 0    estradioL (VIVELLE-DOT) 0.075 mg/24 hr Place 1 patch onto the skin twice a week. 8 patch 11    ferrous sulfate (FEOSOL) 325 mg (65 mg iron) Tab tablet Take 325 mg by mouth.      hydrocortisone 2.5 % cream Apply topically.      levonorgestreL (MIRENA) 20 mcg/24 hours (7 yrs) 52 mg IUD 1 each by Intrauterine route once.      levothyroxine (SYNTHROID) 125 MCG tablet Take 125 mcg by mouth every morning.      meloxicam (MOBIC) 7.5 MG tablet Take 7.5 mg by mouth every morning.      minoxidiL (LONITEN) 2.5 MG tablet       montelukast (SINGULAIR) 10 mg tablet Take 10 mg by mouth every evening.      multivitamin (THERAGRAN) per tablet Take 1 tablet by mouth once daily.      spironolactone (ALDACTONE) 100 MG tablet Take 1 tablet by mouth once daily.      VITAMIN B COMPLEX ORAL Take 1 tablet by mouth once daily.      vitamin D (VITAMIN D3) 1000 units Tab Take 1 tablet by mouth every morning.      ascorbic acid, vitamin C, (VITAMIN C) 1000 MG tablet Take 1,000 mg by mouth. (Patient not taking: Reported on 10/14/2024)      doxycycline (MONODOX) 50 MG Cap  (Patient not taking: Reported on 10/14/2024)      fluconazole (DIFLUCAN) 150 MG Tab Take 1 tablet (150 mg total) by mouth every 72 hours. for 3 doses 3 tablet 0    NP THYROID 120 mg Tab  (Patient not taking: Reported on 10/14/2024)      NP THYROID 15 mg Tab  (Patient not taking: Reported on 10/14/2024)      phentermine (ADIPEX-P) 37.5 mg tablet Take 37.5 mg by mouth before breakfast. (Patient not taking: Reported on 10/14/2024)      ZINC ORAL Take 100 mg by mouth. (Patient not taking: Reported on 10/14/2024)       No current facility-administered medications for this visit.       Review of patient's allergies indicates:  No Known Allergies    Physical Exam  Vitals:    10/14/24 1003   BP: 127/78   Pulse: 71   Resp: 17   Temp: 97.9 °F  (36.6 °C)     General: Well-developed, well-nourished, in no acute distress  HEENT: Normocephalic, atraumatic, extraocular movements intact  Neck: Supple, no supraclavicular or cervical lymphadenopathy, trachea midline  Respirations: even and unlabored  : 11/20/23-Normal external female genitalia without lesions. Orthotopic urethral meatus. Fairly healthy vaginal mucosa. Grade 2 Cysotcele, + urethral hypermobility  Extremities: moves all equally, no clubbing, cyanosis or edema  Skin: Warm and dry. No lesions  Psych: normal affect  Neuro: Alert and oriented x 3. Cranial nerves II-XII intact    PVR: 17cc    UA: trace LE, otherwise negative    Assessment:  1. Stress incontinence in female  POCT Urinalysis, Dipstick, Automated, W/O Scope    POCT Bladder Scan      2. Vaginal itching        3. OAB (overactive bladder)              Plan:   Stress incontinence in female  -     POCT Urinalysis, Dipstick, Automated, W/O Scope  -     POCT Bladder Scan    Vaginal itching    OAB (overactive bladder)    Other orders  -     fluconazole (DIFLUCAN) 150 MG Tab; Take 1 tablet (150 mg total) by mouth every 72 hours. for 3 doses  Dispense: 3 tablet; Refill: 0    Pt not too bothered by urge symptoms right now. Discussed kegels, Bulkamid touch up and sling. Pt wants to observe for now but may elect for bulkamid touch up in the future.   Follow up in about 6 months (around 4/14/2025).

## 2024-11-13 ENCOUNTER — TELEPHONE (OUTPATIENT)
Dept: UROLOGY | Facility: CLINIC | Age: 49
End: 2024-11-13
Payer: COMMERCIAL

## 2024-11-13 DIAGNOSIS — N39.3 STRESS INCONTINENCE IN FEMALE: Primary | ICD-10-CM

## 2024-11-13 DIAGNOSIS — N32.81 OAB (OVERACTIVE BLADDER): Primary | ICD-10-CM

## 2024-11-13 DIAGNOSIS — N39.3 SUI (STRESS URINARY INCONTINENCE, FEMALE): ICD-10-CM

## 2024-11-13 DIAGNOSIS — Z01.818 PRE-OP TESTING: ICD-10-CM

## 2024-11-13 RX ORDER — CEFAZOLIN SODIUM 2 G/50ML
2 SOLUTION INTRAVENOUS
OUTPATIENT
Start: 2024-11-13

## 2024-11-13 RX ORDER — SODIUM CHLORIDE 9 MG/ML
INJECTION, SOLUTION INTRAVENOUS CONTINUOUS
OUTPATIENT
Start: 2024-11-13

## 2024-11-13 NOTE — TELEPHONE ENCOUNTER
.Outgoing call placed to patient, patient verified name and date of birth, patient stated she would like to have it completed on 12/19/24, pre-op testing scheduled per patient request, MD notified of preferred date, verbalized pre-operative instructions with patient thoroughly and asked if she had any questions/concerns, she denied and verbalized understanding of the instructions. Also notified patient that I will upload the pre-operative instructions to her MyChart as well so she can have if needs to review again. Encouraged to contact us if she needs any further assistance.       ----- Message from Keira Riggins MD sent at 11/13/2024 11:30 AM CST -----  Regarding: RE: Procedure  Contact: Arlyn  I can offer Dec 16, 19, 26 or 30. (Mondays at [place], Thurs at [place]). Please see what her preference is.  ----- Message -----  From: Raman Reddy RN  Sent: 11/13/2024  10:56 AM CST  To: Keira Riggins MD  Subject: Procedure                                        Please advise, patient stated you told her to just call in when she is ready to schedule.  ----- Message -----  From: Adwoa Shaikh  Sent: 11/13/2024   9:12 AM CST  To: Gilson MIRANDA Staff    Mrs. Stoddard needs a call back at [phone] in regards to Medibulk they discussed on the last visit. She stated that she's interested & have more questions, & will like to get it done as soon as possible because she's almost to the max of her out of pocket for the year.    Thanks  MW

## 2024-11-13 NOTE — TELEPHONE ENCOUNTER
.Outgoing call placed to patient, patient verified name and date of birth, patient stated that she was told by Dr. Riggins that she can just call when she is ready to schedule procedure, she did not want to set up another appointment to discuss. Message sent to Dr. Riggins in regards.      ----- Message from Adwoa sent at 11/13/2024  9:08 AM CST -----  Contact: Arlyn Stoddard needs a call back at [phone] in regards to Medibulk they discussed on the last visit. She stated that she's interested & have more questions, & will like to get it done as soon as possible because she's almost to the max of her out of pocket for the year.    Thanks  MW

## 2024-12-03 ENCOUNTER — HOSPITAL ENCOUNTER (OUTPATIENT)
Dept: CARDIOLOGY | Facility: HOSPITAL | Age: 49
Discharge: HOME | End: 2024-12-03
Attending: UROLOGY
Payer: COMMERCIAL

## 2024-12-03 DIAGNOSIS — Z01.818 PRE-OP TESTING: ICD-10-CM

## 2024-12-03 LAB
OHS QRS DURATION: 82 MS
OHS QTC CALCULATION: 443 MS

## 2024-12-03 PROCEDURE — 93005 ELECTROCARDIOGRAM TRACING: CPT

## 2024-12-03 PROCEDURE — 93010 ELECTROCARDIOGRAM REPORT: CPT | Mod: ,,, | Performed by: INTERNAL MEDICINE

## 2024-12-06 ENCOUNTER — TELEPHONE (OUTPATIENT)
Dept: UROLOGY | Facility: CLINIC | Age: 49
End: 2024-12-06
Payer: COMMERCIAL

## 2024-12-06 RX ORDER — NITROFURANTOIN 25; 75 MG/1; MG/1
100 CAPSULE ORAL 2 TIMES DAILY
Qty: 14 CAPSULE | Refills: 0 | Status: SHIPPED | OUTPATIENT
Start: 2024-12-06 | End: 2024-12-13

## 2024-12-06 NOTE — TELEPHONE ENCOUNTER
----- Message from Keira Riggins MD sent at 12/6/2024  9:16 AM CST -----  Notify pt of slight UTI. Macrobid sent to her pharmacy to take before surgery.

## 2024-12-06 NOTE — TELEPHONE ENCOUNTER
Spoke with patient who was able to provide acceptable patient identifiers prior to start of conversation. Patient notified of urine culture results that showed infection and that medication Macrobid have been sent to local pharmacy to be taken twice daily for 7-Days. Patient verbalized understanding.

## 2024-12-16 ENCOUNTER — TELEPHONE (OUTPATIENT)
Dept: UROLOGY | Facility: CLINIC | Age: 49
End: 2024-12-16
Payer: COMMERCIAL

## 2024-12-16 NOTE — TELEPHONE ENCOUNTER
.Outgoing call placed to patient, patient verified name and date of birth, patient notified of below per , verbalized understanding and no further assistance needed.      ----- Message from Keira Riggins MD sent at 12/16/2024 12:56 PM CST -----  Contact: Arlyn  Should be fine.    ----- Message -----  From: Mirella Pérez MA  Sent: 12/16/2024  10:24 AM CST  To: Keira Riggins MD    Pt is receiving a steroid cortisone injection in her knee before her sx Thursday, wants to know if this is okay  and won't affect anything.  ----- Message -----  From: Kamini Carrizales  Sent: 12/16/2024   9:52 AM CST  To: [facility] MIRANDA Staff    .Type:  Patient Requesting Call    Who Called:Arlyn  Does the patient know what this is regarding?:upcoming procedure and needs medication instructions  Would the patient rather a call back or a response via [employer]? Callback  Best Call Back Number:.[phone] (home)    Additional Information:

## 2024-12-18 ENCOUNTER — HOSPITAL ENCOUNTER (OUTPATIENT)
Dept: CARDIOLOGY | Facility: HOSPITAL | Age: 49
Discharge: HOME | End: 2024-12-18
Payer: COMMERCIAL

## 2024-12-18 ENCOUNTER — OFFICE VISIT (OUTPATIENT)
Dept: INTERNAL MEDICINE | Facility: CLINIC | Age: 49
End: 2024-12-18
Payer: COMMERCIAL

## 2024-12-18 VITALS
DIASTOLIC BLOOD PRESSURE: 67 MMHG | WEIGHT: 160.94 LBS | SYSTOLIC BLOOD PRESSURE: 107 MMHG | BODY MASS INDEX: 25.86 KG/M2 | TEMPERATURE: 98 F | HEART RATE: 73 BPM | HEIGHT: 66 IN | OXYGEN SATURATION: 99 %

## 2024-12-18 DIAGNOSIS — Z01.818 PRE-OP TESTING: ICD-10-CM

## 2024-12-18 DIAGNOSIS — R73.03 PREDIABETES: ICD-10-CM

## 2024-12-18 DIAGNOSIS — N39.3 STRESS INCONTINENCE IN FEMALE: Primary | ICD-10-CM

## 2024-12-18 DIAGNOSIS — E03.9 HYPOTHYROIDISM, UNSPECIFIED TYPE: ICD-10-CM

## 2024-12-18 DIAGNOSIS — M19.90 ARTHRITIS: ICD-10-CM

## 2024-12-18 DIAGNOSIS — F41.9 ANXIETY: ICD-10-CM

## 2024-12-18 DIAGNOSIS — Z79.890 HORMONE REPLACEMENT THERAPY: ICD-10-CM

## 2024-12-18 PROBLEM — Z87.898 HISTORY OF IMPAIRED GLUCOSE TOLERANCE: Status: ACTIVE | Noted: 2024-12-18

## 2024-12-18 LAB
OHS QRS DURATION: 88 MS
OHS QTC CALCULATION: 416 MS

## 2024-12-18 PROCEDURE — 93010 ELECTROCARDIOGRAM REPORT: CPT | Mod: ,,, | Performed by: INTERNAL MEDICINE

## 2024-12-18 PROCEDURE — 99999 PR PBB SHADOW E&M-EST. PATIENT-LVL IV: CPT | Mod: PBBFAC,,,

## 2024-12-18 PROCEDURE — 93005 ELECTROCARDIOGRAM TRACING: CPT

## 2024-12-18 NOTE — PROGRESS NOTES
Preoperative History and Physical  Pre- Admit Testing                                                                   Chief Complaint: Preoperative evaluation     History of Present Illness:      Arlyn Vaughan is a 49 y.o. female who presents to the office today for a preoperative consultation at the request of Dr. Riggins who plans on performing a CYSTOSCOPY, WITH PERIURETHRAL BULKING AGENT INJECTION on December 19.     Functional Status:      The patient is able to climb a flight of stairs. The patient is able to ambulate independently without difficulty. The patient's functional status is not affected by the surgical problem. The patient's functional status is not affected by shortness of breath, chest pain, dyspnea on exertion and fatigue.    MET score greater than 4    Patient Anesthesia History:      History of Malignant Hyperthermia: no  History of Pseudocholinesterase Deficiency: no  History PONV: no  History of difficult intubation: no  History of delayed emergence: no  History of high fever after anesthesia: no    Family Anesthesia History:      History of Malignant Hyperthermia: no  History of Pseudocholinesterase Deficiency: no    Past Medical History:      Past Medical History:   Diagnosis Date    Anemia     Arthritis     Breast cyst     Fever blister     Graves disease     Hypothyroidism     IBS (irritable bowel syndrome)     Lichen sclerosus         Past Surgical History:      Past Surgical History:   Procedure Laterality Date    COLONOSCOPY N/A 02/02/2022    Procedure: COLONOSCOPY;  Surgeon: Fransico Miles III, MD;  Location: [facility];  Service: Endoscopy;  Laterality: N/A;    CYSTOSCOPY WITH INJECTION OF PERIURETHRAL BULKING AGENT N/A 3/11/2024    Procedure: CYSTOSCOPY, WITH PERIURETHRAL BULKING AGENT INJECTION;  Surgeon: Keira Riggins MD;  Location: [facility];  Service: Urology;  Laterality: N/A;    SALPINGOOPHORECTOMY Left 2008    SHOULDER  SURGERY Right         Social History:      Social History     Socioeconomic History    Marital status:    Tobacco Use    Smoking status: Never     Passive exposure: Never    Smokeless tobacco: Never   Substance and Sexual Activity    Alcohol use: Never    Drug use: Never    Sexual activity: Yes     Partners: Male     Birth control/protection: I.U.D.        Family History:      Family History   Problem Relation Name Age of Onset    Diabetes type II Father Ralph     Migraines Father Ralph     Heart disease Mother Livia     Gallbladder disease Mother Livia     Fibromyalgia Mother Livia     Arrhythmia Mother Livia     Breast cancer Mother Livia     Osteoarthritis Mother Livia     Rashes / Skin problems Mother Livia     Thyroid disease Mother Livia     Gallbladder disease Sister      Rashes / Skin problems Maternal Grandfather Anthony Carrizales     Diabetes Paternal Grandmother Shyann        Allergies:      Review of patient's allergies indicates:  No Known Allergies    Medications:      Current Outpatient Medications   Medication Sig    biotin 10,000 mcg Cap Take by mouth.    busPIRone (BUSPAR) 5 MG Tab Take one tablet once a day    calcium carbonate/vitamin D3 (VITAMIN D-3 ORAL)     cetirizine (ZYRTEC) 10 MG tablet Take 10 mg by mouth once daily.    clobetasoL (TEMOVATE) 0.05 % cream APPLY  CREAM TOPICALLY TO AFFECTED AREA TWICE DAILY FOR 14 DAYS    estradioL (IMVEXXY MAINTENANCE PACK) 4 mcg Inst Place 4 mcg vaginally twice a week.    estradioL (VIVELLE-DOT) 0.075 mg/24 hr Place 1 patch onto the skin twice a week.    ferrous sulfate (FEOSOL) 325 mg (65 mg iron) Tab tablet Take 325 mg by mouth.    hydrocortisone 2.5 % cream Apply topically.    levonorgestreL (MIRENA) 20 mcg/24 hours (7 yrs) 52 mg IUD 1 each by Intrauterine route once.    levothyroxine (SYNTHROID) 125 MCG tablet Take 125 mcg by mouth every morning.    meloxicam (MOBIC) 7.5 MG tablet Take 7.5 mg by mouth every morning.     minoxidiL (LONITEN) 2.5 MG tablet     montelukast (SINGULAIR) 10 mg tablet Take 10 mg by mouth every evening.    multivitamin (THERAGRAN) per tablet Take 1 tablet by mouth once daily.    NP THYROID 120 mg Tab     NP THYROID 15 mg Tab     phentermine (ADIPEX-P) 37.5 mg tablet Take 37.5 mg by mouth before breakfast.    spironolactone (ALDACTONE) 100 MG tablet Take 1 tablet by mouth once daily.    VITAMIN B COMPLEX ORAL Take 1 tablet by mouth once daily.    vitamin D (VITAMIN D3) 1000 units Tab Take 1 tablet by mouth every morning.    ZINC ORAL Take 100 mg by mouth.     No current facility-administered medications for this visit.       Vitals:      Vitals:    12/18/24 0823   BP: 107/67   Pulse: 73   Temp: 97.5 °F (36.4 °C)       Review of Systems:        Constitutional: Negative for fever, chills, weight loss, malaise/fatigue and diaphoresis.   HENT: Negative for hearing loss, ear pain, nosebleeds, congestion, sore throat, neck pain, tinnitus and ear discharge.    Eyes: Negative for blurred vision, double vision, photophobia, pain, discharge and redness.   Respiratory: Negative for cough, hemoptysis, sputum production, shortness of breath, wheezing and stridor.    Cardiovascular: Negative for chest pain, palpitations, orthopnea, claudication, leg swelling and PND.   Gastrointestinal: Negative for heartburn, nausea, vomiting, abdominal pain, diarrhea, constipation, blood in stool and melena.   Genitourinary: Negative for dysuria, urgency, frequency, hematuria and flank pain.   Musculoskeletal: Negative for myalgias, back pain, joint pain and falls. + L knee pain   Skin: Negative for itching and rash.   Neurological: Negative for dizziness, tingling, tremors, sensory change, speech change, focal weakness, seizures, loss of consciousness, weakness and headaches.   Endo/Heme/Allergies: Negative for environmental allergies and polydipsia. Does not bruise/bleed easily.   Psychiatric/Behavioral: Negative for depression,  suicidal ideas, hallucinations, memory loss and substance abuse. The patient is not nervous/anxious and does not have insomnia.    All 14 systems reviewed and negative except as noted above.    Physical Exam:      Constitutional: Appears well-developed, well-nourished and in no acute distress.  Patient is oriented to person, place, and time.   Head: Normocephalic and atraumatic. Mucous membranes moist.  Neck: Neck supple no mass.   Cardiovascular: Normal rate and regular rhythm.  S1 S2 appreciated by ascultation.  Pulmonary/Chest: Effort normal and clear to auscultation bilaterally. No respiratory distress.   Abdomen: Soft. Non-tender and non-distended. Bowel sounds are normal.   Neurological: Patient is alert and oriented to person, place and time. Moves all extremities.  Skin: Warm and dry. No lesions.  Extremities: No clubbing, cyanosis. Trace BLE pedal edema     Laboratory data:      Reviewed and noted in plan where applicable. Please see chart for full laboratory data.    Lab Results   Component Value Date    WBC 6.22 12/03/2024    HGB 12.8 12/03/2024    HCT 41.2 12/03/2024    MCV 77 (L) 12/03/2024     12/03/2024       Comprehensive Metabolic Panel  Order: [id number]  Status: Final result  Dx: Pre-op testing         Component Ref Range & Units 2 wk ago   Sodium 136 - 145 mmol/L 140   Potassium 3.5 - 5.1 mmol/L 3.8   Chloride 95 - 110 mmol/L 106   CO2 23 - 29 mmol/L 21 Low    Glucose 70 - 110 mg/dL 79   BUN 6 - 20 mg/dL 14   Creatinine 0.5 - 1.4 mg/dL 0.7   Calcium 8.7 - 10.5 mg/dL 9.6   Total Protein 6.0 - 8.4 g/dL 7.5   Albumin 3.5 - 5.2 g/dL 4.5   Total Bilirubin 0.1 - 1.0 mg/dL 0.8   Comment: For infants and newborns, interpretation of results should be based  on gestational age, weight and in agreement with clinical  observations.    Premature Infant recommended reference ranges:  Up to 24 hours.............<8.0 mg/dL  Up to 48 hours............<12.0 mg/dL  3-5 days..................<15.0 mg/dL  6-29  days.................<15.0 mg/dL   Alkaline Phosphatase 40 - 150 U/L 78   AST 10 - 40 U/L 25   ALT 10 - 44 U/L 27   eGFR >60 mL/min/1.73 m^2 >60.0   Anion Gap 8 - 16 mmol/L 13   Resulting Agency  [facility]              Narrative  Performed by: [facility]  Send normal result to authorizing provider's In Basket if  patient is active on MyChart:->Yes   Specimen Collected: 12/03/24 09:53 CST Last Resulted: 12/03/24 17:08 CST          Predictors of intubation difficulty:       Morbid obesity? no   Anatomically abnormal facies? no   Prominent incisors? no   Receding mandible? no   Short, thick neck? no   Neck range of motion: normal   Dentition: No chipped, loose, or missing teeth.  Based on the Modified Mallampati, patient is a mallampati score: II (hard and soft palate, upper portion of tonsils anduvula visible)    Cardiographics:      ECG: normal sinus rhythm    Echocardiogram: not indicated    Imaging:      Chest x-ray: The cardiomediastinal silhouette is normal. The lungs are clear. There is no pleural effusion or pneumothorax per imaging on 2/20/2024    Assessment and Plan:      1. Stress incontinence in female  Assessment & Plan:  CYSTOSCOPY, WITH PERIURETHRAL BULKING AGENT INJECTION planned per Dr. Riggins on 12/19/2024    Known risk factors for perioperative complications: None      Difficulty with intubation is not anticipated.    Cardiac Risk Estimation: Based on the Revised Cardiac Risk index, patient is a Class I risk with a 3.9 % risk of a major cardiac event in a low risk procedure.    1.) Preoperative workup as follows: ECG, hemoglobin, hematocrit, electrolytes, creatinine, glucose.  2.) Change in medication regimen before surgery: discontinue ASA 6 days before surgery, discontinue NSAIDs (Meloxicam/Celebrex) 5 days before surgery, hold Metformin 24 hours prior to surgery. Hold all vitamins/supplements for 7 days prior to surgery, with the exception of Potassium and Iron supplementation. Hold semaglutide/tirzepatide  for 7 days prior to surgery.   3.) Prophylaxis for cardiac events with perioperative beta-blockers: not indicated.  4.) Invasive hemodynamic monitoring perioperatively: at the discretion of anesthesiologist.  5.) Deep vein thrombosis prophylaxis postoperatively: regimen to be chosen by surgical team.  6.) Surveillance for postoperative MI with ECG immediately postoperatively and on postoperati ve days 1 and 2 AND troponin levels 24 hours postoperatively and on day 4 or hospital discharge (whichever comes first): at the discretion of anesthesiologist.  7.) Current medications which may produce withdrawal symptoms if withheld perioperatively: None  8.) Other measures: Postoperative hypertension management with IV hydralazine until able to take oral medications.  Postoperative incentive spirometry to prevent pneumonia.   -patient reports presence of Mirena IUD in place      2. Pre-op testing  -     Ambulatory referral/consult to Pre-Admit  -     EKG 12-lead; Future; Expected date: 12/18/2024    3. Anxiety  Assessment & Plan:  -symptoms stable   -Buspar continued- take morning of surgery       4. Hormone replacement therapy  Assessment & Plan:  -Estradiol continued- hold morning of surgery       5. Arthritis  Assessment & Plan:  -patient previously on Mobic and Celebrex- held 1 week prior to procedure   -Tylenol continued as needed   -careful attention to positioning prior to procedure       6. Hypothyroidism, unspecified type  Assessment & Plan:  -stable   -Synthroid continued- take morning of surgery       7. Prediabetes  Assessment & Plan:  -Hemoglobin A1c 5.7 on 8/7/2024   -Tirzepatide- last dose reported on 12/4/2024  -patient advised to hold Tirzepatide for 7 days prior to procedure   --patient reports hx of phentermine- stopped within the last year                Electronically signed by Maureen Wood NP on 12/18/2024 at 8:33 AM.

## 2024-12-18 NOTE — ASSESSMENT & PLAN NOTE
-Hemoglobin A1c 5.7 on 8/7/2024   -Tirzepatide- last dose reported on 12/4/2024  -patient advised to hold Tirzepatide for 7 days prior to procedure   --patient reports hx of phentermine- stopped within the last year

## 2024-12-18 NOTE — PRE-PROCEDURE INSTRUCTIONS
Procedure Date: 12/19/24  Arrival Time: PLEASE ARRIVE AT 7:45 AM.    Address:   [facility] (Off [facility], 2nd Building on the left)  [id number] [place] [address] [place]. [address]  >>>Please enter through front entrance Lobby of 1st floor to Registration desk<<<      !!!INSTRUCTIONS IMPORTANT!!!  NO FOOD or tobacco products after midnight the night before surgery! You may have clear liquids up to 3 hrs before your arrival to the Hospital  Clear liquids include Gatorade, water, soda, black coffee or tea (no milk or creamer), and clear juices.  Clear liquids do NOT include anything with pulp or food particles (Chicken broth, ice cream, yogurt, Jello, etc.)      MORNING OF SURGERY, drink small sip of water with the following medications instructed by Surgery Pre-Admit Provider:  BUSPAR  SYNTHROID    Diabetic/Prediabetic Patients: If you take diabetic or weight loss medication, Do NOT take morning of surgery unless instructed by Doctor. Metformin to be stopped 24 hrs prior to surgery. Ozempic/ Mounjaro/ Wegovy/ Trulicity/ Semaglutide or any weight loss injections to be stopped 7 days prior to surgery. DO NOT take long-acting insulin the evening before surgery. Blood sugars will be checked in pre-op by Nurse.    !!!STOP ALL Aspirins, NSAIDS, WEIGHT LOSS INJECTIONS/PILLS, Herbal supplements, & Vitamins 7 DAYS BEFORE SURGERY!!! Ok to take Tylenol if needed    ____  Avoid Alcoholic beverages 3 days prior to surgery, as it can thin the blood.  ____  NO Acrylic/fake nails or nail polish worn day of surgery (specifically hand/arm & foot surgeries).  ____  NO powder, lotions, deodorants, oils or cream on body.  ____  Remove all jewelry, piercings, & foreign objects prior to arrival and leave at home.  ____  Remove Dentures, Hearing Aids & Contact Lens prior to surgery.  ____  Bring photo ID and insurance information to hospital (Leave Valuables at Home).  ____  If going home the same day, arrange for a ride home. You  will not be able to drive for 24 hrs if Anesthesia was used.   ____  Females (ages 11-60): may need to give a urine sample the morning of surgery; please see Pre op Nurse prior to using the restroom.  ____  Males: Stop ED medications (Viagra, Cialis) 24 hrs prior to surgery.  ____  Wear clean, loose fitting clothing to allow for dressings/ bandages.      Bathing Instructions:    -Shower with anti-bacterial Soap (Hibiclens or Dial) the night before surgery & the morning of surgery!   -Do not use Hibiclens soap on your face or genitals.   -Apply clean clothes after shower.  -Do not shave your face or body 2 days prior to surgery unless instructed otherwise by your Surgeon.  -Do not shave pubic hair 7 days prior to surgery (gyn pt's).    [facility] Visitor/Ride Policy:  Only 2 adults allowed in pre op/recovery area during your procedure. You MUST HAVE A RIDE HOME from a responsible adult that you know and trust. Medical Transport, Uber or Lyft can ONLY be used if patient has a responsible adult to accompany them during ride home.       *Signs and symptoms of Infection Before or After Surgery:               !!!If you experience any fever, chills, nausea/ vomiting, foul odor/ excessive drainage from surgical site, flu-like symptoms, new wounds or cuts, PLEASE CALL THE SURGEON OFFICE at [phone] or SEND MESSAGE THROUGH [employer] PORTAL!!!       *If you are running late day of surgery, please call the Surgery Dept @ [phone].    *Billing question, please call:  [phone] or [phone]       Thank you,  -[facility] Pre Admit Dept.  [phone] or [phone]  M-F 7:30 am-4:00 pm (Closed Major Holidays)    Additional Tests Scheduled Today:  EKG (4TH Floor) Check in at the !

## 2024-12-18 NOTE — DISCHARGE INSTRUCTIONS
Pre op instructions reviewed with patient face to face during Clinic Visit with Provider, verbalized understanding.    Procedure Date: 12/19/24  Arrival Time: PLEASE ARRIVE AT 7:45 AM.    Address:   [facility] (Off [facility] Building on the left)  [id number] [place] [address] [place]. [address]  >>>Please enter through front entrance Lobby of 1st floor to Registration desk<<<      !!!INSTRUCTIONS IMPORTANT!!!  NO FOOD or tobacco products after midnight the night before surgery! You may have clear liquids up to 3 hrs before your arrival to the Hospital  Clear liquids include Gatorade, water, soda, black coffee or tea (no milk or creamer), and clear juices.  Clear liquids do NOT include anything with pulp or food particles (Chicken broth, ice cream, yogurt, Jello, etc.)      MORNING OF SURGERY, drink small sip of water with the following medications instructed by Surgery Pre-Admit Provider:  BUSPAR  SYNTHROID    Diabetic/Prediabetic Patients: If you take diabetic or weight loss medication, Do NOT take morning of surgery unless instructed by Doctor. Metformin to be stopped 24 hrs prior to surgery. Ozempic/ Mounjaro/ Wegovy/ Trulicity/ Semaglutide or any weight loss injections to be stopped 7 days prior to surgery. DO NOT take long-acting insulin the evening before surgery. Blood sugars will be checked in pre-op by Nurse.    !!!STOP ALL Aspirins, NSAIDS, WEIGHT LOSS INJECTIONS/PILLS, Herbal supplements, & Vitamins 7 DAYS BEFORE SURGERY!!! Ok to take Tylenol if needed    ____  Avoid Alcoholic beverages 3 days prior to surgery, as it can thin the blood.  ____  NO Acrylic/fake nails or nail polish worn day of surgery (specifically hand/arm & foot surgeries).  ____  NO powder, lotions, deodorants, oils or cream on body.  ____  Remove all jewelry, piercings, & foreign objects prior to arrival and leave at home.  ____  Remove Dentures, Hearing Aids & Contact Lens prior to surgery.  ____  Bring photo ID and  insurance information to hospital (Leave Valuables at Home).  ____  If going home the same day, arrange for a ride home. You will not be able to drive for 24 hrs if Anesthesia was used.   ____  Females (ages 11-60): may need to give a urine sample the morning of surgery; please see Pre op Nurse prior to using the restroom.  ____  Males: Stop ED medications (Viagra, Cialis) 24 hrs prior to surgery.  ____  Wear clean, loose fitting clothing to allow for dressings/ bandages.      Bathing Instructions:    -Shower with anti-bacterial Soap (Hibiclens or Dial) the night before surgery & the morning of surgery!   -Do not use Hibiclens soap on your face or genitals.   -Apply clean clothes after shower.  -Do not shave your face or body 2 days prior to surgery unless instructed otherwise by your Surgeon.  -Do not shave pubic hair 7 days prior to surgery (gyn pt's).    [facility] Visitor/Ride Policy:  Only 2 adults allowed in pre op/recovery area during your procedure. You MUST HAVE A RIDE HOME from a responsible adult that you know and trust. Medical Transport, Uber or Lyft can ONLY be used if patient has a responsible adult to accompany them during ride home.       *Signs and symptoms of Infection Before or After Surgery:               !!!If you experience any fever, chills, nausea/ vomiting, foul odor/ excessive drainage from surgical site, flu-like symptoms, new wounds or cuts, PLEASE CALL THE SURGEON OFFICE at [phone] or SEND MESSAGE THROUGH "[employer] PORTAL!!!       *If you are running late day of surgery, please call the Surgery Dept @ [phone].    *Billing question, please call:  [phone] or [phone]       Thank you,  -[facility] Pre Admit Dept.  [phone] or [phone]  M-F 7:30 am-4:00 pm (Closed Major Holidays)    Additional Tests Scheduled Today:  EKG (4TH Floor) Check in at the !

## 2024-12-18 NOTE — ASSESSMENT & PLAN NOTE
-Hemoglobin A1c 5.7 on 8/7/2024   -BMI 25.98  -Patient taking Tirzepatide- last use reported on 12/4/2024   -patient reports hx of phentermine- stopped within the last year

## 2024-12-18 NOTE — ASSESSMENT & PLAN NOTE
CYSTOSCOPY, WITH PERIURETHRAL BULKING AGENT INJECTION planned per Dr. Riggins on 12/19/2024    Known risk factors for perioperative complications: None      Difficulty with intubation is not anticipated.    Cardiac Risk Estimation: Based on the Revised Cardiac Risk index, patient is a Class I risk with a 3.9 % risk of a major cardiac event in a low risk procedure.    1.) Preoperative workup as follows: ECG, hemoglobin, hematocrit, electrolytes, creatinine, glucose.  2.) Change in medication regimen before surgery: discontinue ASA 6 days before surgery, discontinue NSAIDs (Meloxicam/Celebrex) 5 days before surgery, hold Metformin 24 hours prior to surgery. Hold all vitamins/supplements for 7 days prior to surgery, with the exception of Potassium and Iron supplementation. Hold semaglutide/tirzepatide for 7 days prior to surgery.   3.) Prophylaxis for cardiac events with perioperative beta-blockers: not indicated.  4.) Invasive hemodynamic monitoring perioperatively: at the discretion of anesthesiologist.  5.) Deep vein thrombosis prophylaxis postoperatively: regimen to be chosen by surgical team.  6.) Surveillance for postoperative MI with ECG immediately postoperatively and on postoperati ve days 1 and 2 AND troponin levels 24 hours postoperatively and on day 4 or hospital discharge (whichever comes first): at the discretion of anesthesiologist.  7.) Current medications which may produce withdrawal symptoms if withheld perioperatively: None  8.) Other measures: Postoperative hypertension management with IV hydralazine until able to take oral medications.  Postoperative incentive spirometry to prevent pneumonia.   -patient reports presence of Mirena IUD in place

## 2024-12-18 NOTE — ASSESSMENT & PLAN NOTE
-patient previously on Mobic and Celebrex- held 1 week prior to procedure   -Tylenol continued as needed   -careful attention to positioning prior to procedure

## 2024-12-19 ENCOUNTER — HOSPITAL ENCOUNTER (OUTPATIENT)
Facility: HOSPITAL | Age: 49
Discharge: HOME | End: 2024-12-19
Attending: UROLOGY | Admitting: UROLOGY
Payer: COMMERCIAL

## 2024-12-19 ENCOUNTER — ANESTHESIA EVENT (OUTPATIENT)
Dept: SURGERY | Facility: HOSPITAL | Age: 49
End: 2024-12-19
Payer: COMMERCIAL

## 2024-12-19 ENCOUNTER — ANESTHESIA (OUTPATIENT)
Dept: SURGERY | Facility: HOSPITAL | Age: 49
End: 2024-12-19
Payer: COMMERCIAL

## 2024-12-19 VITALS
BODY MASS INDEX: 26.08 KG/M2 | TEMPERATURE: 97 F | HEIGHT: 66 IN | HEART RATE: 65 BPM | SYSTOLIC BLOOD PRESSURE: 119 MMHG | OXYGEN SATURATION: 94 % | RESPIRATION RATE: 18 BRPM | DIASTOLIC BLOOD PRESSURE: 60 MMHG | WEIGHT: 162.25 LBS

## 2024-12-19 DIAGNOSIS — N39.3 SUI (STRESS URINARY INCONTINENCE, FEMALE): Primary | ICD-10-CM

## 2024-12-19 DIAGNOSIS — N39.3 STRESS INCONTINENCE IN FEMALE: ICD-10-CM

## 2024-12-19 PROCEDURE — 36000707: Performed by: UROLOGY

## 2024-12-19 PROCEDURE — L8606 SYNTHETIC IMPLNT URINARY 1ML: HCPCS | Performed by: UROLOGY

## 2024-12-19 PROCEDURE — 37000009 HC ANESTHESIA EA ADD 15 MINS: Performed by: UROLOGY

## 2024-12-19 PROCEDURE — 71000033 HC RECOVERY, INTIAL HOUR: Performed by: UROLOGY

## 2024-12-19 PROCEDURE — 63600175 PHARM REV CODE 636 W HCPCS: Performed by: NURSE ANESTHETIST, CERTIFIED REGISTERED

## 2024-12-19 PROCEDURE — 36000706: Performed by: UROLOGY

## 2024-12-19 PROCEDURE — 25000003 PHARM REV CODE 250: Performed by: ANESTHESIOLOGY

## 2024-12-19 PROCEDURE — 37000008 HC ANESTHESIA 1ST 15 MINUTES: Performed by: UROLOGY

## 2024-12-19 PROCEDURE — 71000015 HC POSTOP RECOV 1ST HR: Performed by: UROLOGY

## 2024-12-19 PROCEDURE — 51715 ENDOSCOPIC INJECTION/IMPLANT: CPT | Mod: ,,, | Performed by: UROLOGY

## 2024-12-19 DEVICE — SYS BULKAMID URETH BULKING 1ML: Type: IMPLANTABLE DEVICE | Site: URETHRA | Status: FUNCTIONAL

## 2024-12-19 RX ORDER — CEFAZOLIN 2 G/1
2 INJECTION, POWDER, FOR SOLUTION INTRAMUSCULAR; INTRAVENOUS
Status: DISCONTINUED | OUTPATIENT
Start: 2024-12-19 | End: 2024-12-19 | Stop reason: HOSPADM

## 2024-12-19 RX ORDER — PROPOFOL 10 MG/ML
VIAL (ML) INTRAVENOUS
Status: DISCONTINUED | OUTPATIENT
Start: 2024-12-19 | End: 2024-12-19

## 2024-12-19 RX ORDER — HYDROMORPHONE HYDROCHLORIDE 1 MG/ML
0.2 INJECTION, SOLUTION INTRAMUSCULAR; INTRAVENOUS; SUBCUTANEOUS EVERY 5 MIN PRN
Status: DISCONTINUED | OUTPATIENT
Start: 2024-12-19 | End: 2024-12-19 | Stop reason: HOSPADM

## 2024-12-19 RX ORDER — PHENYLEPHRINE HYDROCHLORIDE 10 MG/ML
INJECTION INTRAVENOUS
Status: DISCONTINUED | OUTPATIENT
Start: 2024-12-19 | End: 2024-12-19

## 2024-12-19 RX ORDER — CEFAZOLIN SODIUM 1 G/3ML
INJECTION, POWDER, FOR SOLUTION INTRAMUSCULAR; INTRAVENOUS
Status: DISCONTINUED | OUTPATIENT
Start: 2024-12-19 | End: 2024-12-19

## 2024-12-19 RX ORDER — PHENAZOPYRIDINE HYDROCHLORIDE 200 MG/1
200 TABLET, FILM COATED ORAL 3 TIMES DAILY PRN
Qty: 15 TABLET | Refills: 0 | Status: SHIPPED | OUTPATIENT
Start: 2024-12-19 | End: 2024-12-29

## 2024-12-19 RX ORDER — FENTANYL CITRATE 50 UG/ML
INJECTION, SOLUTION INTRAMUSCULAR; INTRAVENOUS
Status: DISCONTINUED | OUTPATIENT
Start: 2024-12-19 | End: 2024-12-19

## 2024-12-19 RX ORDER — MIDAZOLAM HYDROCHLORIDE 1 MG/ML
INJECTION INTRAMUSCULAR; INTRAVENOUS
Status: DISCONTINUED | OUTPATIENT
Start: 2024-12-19 | End: 2024-12-19

## 2024-12-19 RX ORDER — SODIUM CHLORIDE 9 MG/ML
INJECTION, SOLUTION INTRAVENOUS CONTINUOUS
Status: DISCONTINUED | OUTPATIENT
Start: 2024-12-19 | End: 2024-12-19 | Stop reason: HOSPADM

## 2024-12-19 RX ORDER — LIDOCAINE HYDROCHLORIDE 20 MG/ML
INJECTION INTRAVENOUS
Status: DISCONTINUED | OUTPATIENT
Start: 2024-12-19 | End: 2024-12-19

## 2024-12-19 RX ORDER — ONDANSETRON HYDROCHLORIDE 2 MG/ML
4 INJECTION, SOLUTION INTRAVENOUS DAILY PRN
Status: DISCONTINUED | OUTPATIENT
Start: 2024-12-19 | End: 2024-12-19 | Stop reason: HOSPADM

## 2024-12-19 RX ORDER — ONDANSETRON HYDROCHLORIDE 2 MG/ML
INJECTION, SOLUTION INTRAVENOUS
Status: DISCONTINUED | OUTPATIENT
Start: 2024-12-19 | End: 2024-12-19

## 2024-12-19 RX ORDER — OXYCODONE AND ACETAMINOPHEN 5; 325 MG/1; MG/1
1 TABLET ORAL
Status: DISCONTINUED | OUTPATIENT
Start: 2024-12-19 | End: 2024-12-19 | Stop reason: HOSPADM

## 2024-12-19 RX ADMIN — PROPOFOL 150 MG: 10 INJECTION, EMULSION INTRAVENOUS at 09:12

## 2024-12-19 RX ADMIN — FENTANYL CITRATE 50 MCG: 50 INJECTION, SOLUTION INTRAMUSCULAR; INTRAVENOUS at 09:12

## 2024-12-19 RX ADMIN — ONDANSETRON 4 MG: 2 INJECTION INTRAMUSCULAR; INTRAVENOUS at 09:12

## 2024-12-19 RX ADMIN — OXYCODONE HYDROCHLORIDE AND ACETAMINOPHEN 1 TABLET: 5; 325 TABLET ORAL at 10:12

## 2024-12-19 RX ADMIN — CEFAZOLIN 2 G: 330 INJECTION, POWDER, FOR SOLUTION INTRAMUSCULAR; INTRAVENOUS at 09:12

## 2024-12-19 RX ADMIN — PHENYLEPHRINE HYDROCHLORIDE 200 MCG: 10 INJECTION INTRAVENOUS at 09:12

## 2024-12-19 RX ADMIN — SODIUM CHLORIDE, POTASSIUM CHLORIDE, SODIUM LACTATE AND CALCIUM CHLORIDE: 600; 310; 30; 20 INJECTION, SOLUTION INTRAVENOUS at 09:12

## 2024-12-19 RX ADMIN — LIDOCAINE HYDROCHLORIDE 100 MG: 20 INJECTION INTRAVENOUS at 09:12

## 2024-12-19 RX ADMIN — MIDAZOLAM HYDROCHLORIDE 2 MG: 1 INJECTION, SOLUTION INTRAMUSCULAR; INTRAVENOUS at 09:12

## 2024-12-19 NOTE — OP NOTE
Date: 12/19/2024      Procedure: Cystoscopy w urethral bulking injections    Surgeon: Keira Riggins MD    Pre-op Diagnosis  Stress urinary incontinence      Post-op Diagnosis  Same    Estimated Blood Loss: 2cc    Drains: none    Complications: None    Specimens: No specimens collected during this procedure.    Implants: * No implants in log *    Disposition: PACU - hemodynamically stable.    Condition: stable    Indication for procedure:    49 y.o.yo female with h/o  stress urinary incontinence presents today for treatment with urethral bulking injections.     Procedure in detail:    Informed consent was obtained. The patient was premedicated and brought back to the operative suite. They were placed on the cystoscopy table in the supine position. Sequential compression devices were placed on her lower extremities bilaterally. The patient underwent anesthesia. They were then placed in the dorsal lithotomy position and all pressure points were padded. The genitalia was prepped and draped in the usual sterile fashion. A formal timeout was performed. I began the procedure by advancing the Bulkamid 0 degree scope with the sheath into the urethra. Systematic review was performed of the bladder revealing no stone, foreign body or tumor. Bilateral ureteral orifices were visualized and noted to be effluxing clear urine. The Bulkamid needle was then advanced into the rotatable sheath until the tip of the sheath was adjacent to the bladder neck. The sheath was then rotated to the 7 o'clock position. The needle was then extended into the bladder until the 2cm antonia on the needle was visible. The Bulkamid system was then retracted until the tip of the needle was resting on the bladder neck. The needle was then retracted into the sheath and approximately 1.5cm from the bladder neck the Bulkamid system was pressed parallel against the urethral wall and the needle was advanced into the submucosal tissue, ensuring that the bevel  of the needle was facing towards the lumen. The needle was advanced approximately 0.5cm, and the Bulkamid hydrogel was then injected until the Bulkamid cushion was visible and reached the midline of the urethral lumen. The needle was then retracted back into the rotatable sheath and rotated to the next injection site. Subsequent injections were performed at 5 o'clock, 2 o'clock and 10 o'clock all along the same plane as the original injection until all 4 cushions met at the midline of the urethral lumen. 2mLs total of Bulkamid Hydrogel was used. The bladder was left full at the conclusion of the procedure. The patient tolerated the procedure well and awakened in good condition.

## 2024-12-19 NOTE — DISCHARGE SUMMARY
[facility] - Surgery (Hospital)  Discharge Note  Short Stay    Procedure(s) (LRB):  CYSTOSCOPY, WITH PERIURETHRAL BULKING AGENT INJECTION (N/A)      OUTCOME: Patient tolerated treatment/procedure well without complication and is now ready for discharge.    DISPOSITION: Home or Self Care    FINAL DIAGNOSIS:  Stress urinary incontinence    FOLLOWUP: In clinic    DISCHARGE INSTRUCTIONS:    Discharge Procedure Orders   Diet Adult Regular     No dressing needed     Notify your health care provider if you experience any of the following:  temperature >100.4     Notify your health care provider if you experience any of the following:  persistent nausea and vomiting or diarrhea     Notify your health care provider if you experience any of the following:  severe uncontrolled pain     Activity as tolerated         Clinical Reference Documents Added to Patient Instructions         Document    COAPTITE INJECTION (ENGLISH)    CYSTOSCOPY DISCHARGE INSTRUCTIONS (ENGLISH)            TIME SPENT ON DISCHARGE: 10 minutes

## 2024-12-19 NOTE — H&P
CC: Stress incontinence      History of Present Illness:   Arlyn Vaughan is a 49 y.o. female here for bulkamid    12/19/24-Here for bulkamid.   10/14/24-Still having occasional JOSE with sneezing. Nothing with squatting or other exercise, but she hasn't been as active as usual at the gym. She hasn't noticed it with normal activity. Having some back issues. Occasional urgency with slight UUI, but no complete incontinence. Last week, she was having some vaginal itching. No discharge.   3/27/24-S/P Bulkamid a few weeks ago. Pt happy with results. No longer wearing liners. Minimal JOSE. No post-op pain or any significant hematuria.   2/19/24-Pt attended pelvic floor PT and is improved, but still having JOSE at times. Wears pads only if she knows she is going to be doing activity. A little UUI. No dysuria/gross hematuria.  11/20/23-49yo female, here for evaluation of urinary incontinence. Pt reports that for the past couple of years, she ahs been having JOSE with sneeze, laugh. Progressively getting worse. Wears a liner, especially when she goes to the gym. She can even leak with jumping, lifting. She reports incontinence even with intercourse. In October, she had her first diagnosed UTI. Symptoms were painful and went on for a week, so she went to urgent care.   She saw Dr. Hunt, who prescribed vesicare, which didn't help with the incontinence, but caused an intermittent urinary stream. She has since stopped it.   She does report some urgency, which may occur as she gets close to the restroom. Sometimes she has UUI on the way.   She struggles with constipation. She was recently diagnosed with IBS. Taking probiotics and drinking Plexus.       Review of Systems   Constitutional:  Positive for fatigue.   Respiratory:  Negative for shortness of breath.    Cardiovascular:  Negative for chest pain.   Gastrointestinal:  Negative for abdominal pain.   Musculoskeletal:  Positive for arthralgias and back pain.   All other  systems reviewed and are negative.        Past Medical History:   Diagnosis Date    Anemia     Arthritis     Breast cyst     Fever blister     Graves disease     Hypothyroidism     IBS (irritable bowel syndrome)     Lichen sclerosus        Past Surgical History:   Procedure Laterality Date    COLONOSCOPY N/A 02/02/2022    Procedure: COLONOSCOPY;  Surgeon: Fransico Miles III, MD;  Location: [facility];  Service: Endoscopy;  Laterality: N/A;    CYSTOSCOPY WITH INJECTION OF PERIURETHRAL BULKING AGENT N/A 3/11/2024    Procedure: CYSTOSCOPY, WITH PERIURETHRAL BULKING AGENT INJECTION;  Surgeon: Keira Riggins MD;  Location: [facility] OR;  Service: Urology;  Laterality: N/A;    SALPINGOOPHORECTOMY Left 2008    SHOULDER SURGERY Right        Family History   Problem Relation Name Age of Onset    Diabetes type II Father Ralph     Migraines Father Ralph     Heart disease Mother Livia     Gallbladder disease Mother Livia     Fibromyalgia Mother Livia     Arrhythmia Mother Livia     Breast cancer Mother Livia     Osteoarthritis Mother Livia     Rashes / Skin problems Mother Livia     Thyroid disease Mother Livia     Gallbladder disease Sister      Rashes / Skin problems Maternal Grandfather Anthony Carrizales     Diabetes Paternal Grandmother Shyann        Social History     Tobacco Use    Smoking status: Never     Passive exposure: Never    Smokeless tobacco: Never   Substance Use Topics    Alcohol use: Never    Drug use: Never       Current Facility-Administered Medications   Medication Dose Route Frequency Provider Last Rate Last Admin    0.9% NaCl infusion   Intravenous Continuous Keira Riggins MD        ceFAZolin 2 g  2 g Intravenous On Call Procedure Keria Riggins MD           Review of patient's allergies indicates:  No Known Allergies    Physical Exam  Vitals:    12/19/24 0900   BP: 120/63   Pulse: 61   Resp: 16   Temp: 97.9 °F (36.6 °C)     General: Well-developed, well-nourished, in no  acute distress  HEENT: Normocephalic, atraumatic, extraocular movements intact  Neck: Supple, no supraclavicular or cervical lymphadenopathy, trachea midline  Respirations: even and unlabored  : 11/20/23-Normal external female genitalia without lesions. Orthotopic urethral meatus. Fairly healthy vaginal mucosa. Grade 2 Cysotcele, + urethral hypermobility  Extremities: moves all equally, no clubbing, cyanosis or edema  Skin: Warm and dry. No lesions  Psych: normal affect  Neuro: Alert and oriented x 3. Cranial nerves II-XII intact      Assessment:  1. Stress incontinence in female  POCT Urinalysis, Dipstick, Automated, W/O Scope    POCT Bladder Scan      2. Vaginal itching        3. OAB (overactive bladder)              Plan:   Stress incontinence in female  -     Vital signs; Standing  -     Insert peripheral IV; Standing  -     Diet NPO; Standing  -     Pulse Oximetry Q4H; Standing  -     Full code; Standing  -     Place in Outpatient; Standing  -     Place sequential compression device; Standing    JOSE (stress urinary incontinence, female)    Other orders  -     0.9% NaCl infusion  -     IP VTE LOW RISK PATIENT; Standing  -     ceFAZolin 2 g  -     Admit to Phase 1 PACU, transfer to Phase 2 per protocol when indicated ; Standing  -     Vital signs; Standing  -     HYDROmorphone injection 0.2 mg  -     oxyCODONE-acetaminophen 5-325 mg per tablet 1 tablet  -     ondansetron injection 4 mg  -     Intake and output Per protocol; Standing  -     Apply warming blanket; Standing  -     Notify Anesthesiologist; Standing  -     Notify Physician - Potential Need of Opioid Reversal; Standing  -     Oxygen Continuous; Standing  -     Pulse Oximetry Continuous; Standing  -     POCT urine pregnancy; Standing    Risks/benefits of bulkamid discussed. She will undergo Bulkamid today. May need OAB meds if urge symptoms are not improved.

## 2024-12-19 NOTE — ANESTHESIA PREPROCEDURE EVALUATION
12/19/2024  Arlyn Vaughan is a 49 y.o., female.  Patient Active Problem List   Diagnosis    Depression    Hypothyroidism    Pelvic floor dysfunction    Stress incontinence in female    Anxiety    Hormone replacement therapy    Arthritis    Prediabetes     Past Surgical History:   Procedure Laterality Date    COLONOSCOPY N/A 02/02/2022    Procedure: COLONOSCOPY;  Surgeon: Fransico Miles III, MD;  Location: [facility];  Service: Endoscopy;  Laterality: N/A;    CYSTOSCOPY WITH INJECTION OF PERIURETHRAL BULKING AGENT N/A 3/11/2024    Procedure: CYSTOSCOPY, WITH PERIURETHRAL BULKING AGENT INJECTION;  Surgeon: Keira Riggins MD;  Location: [facility];  Service: Urology;  Laterality: N/A;    SALPINGOOPHORECTOMY Left 2008    SHOULDER SURGERY Right          Pre-op Assessment    I have reviewed the Patient Summary Reports.    I have reviewed the NPO Status.   I have reviewed the Medications.     Review of Systems  Anesthesia Hx:  No problems with previous Anesthesia                Social:  Non-Smoker       Hematology/Oncology:  Hematology Normal                                     Cardiovascular:  Cardiovascular Normal                                              Pulmonary:  Pulmonary Normal                       Renal/:  Renal/ Normal                 Hepatic/GI:  Hepatic/GI Normal                    Musculoskeletal:  Arthritis               Neurological:  Neurology Normal                                      Endocrine:   Hypothyroidism  Prediabetes        Psych:   anxiety                 Physical Exam  General: Well nourished    Airway:  Mallampati: II   Mouth Opening: Normal  TM Distance: Normal  Neck ROM: Normal ROM    Dental:  Intact        Anesthesia Plan  Type of Anesthesia, risks & benefits discussed:    Anesthesia Type: Gen ETT, Gen Supraglottic Airway, MAC  Intra-op Monitoring Plan: Standard ASA  Monitors  Post Op Pain Control Plan: multimodal analgesia  Induction:  IV  Airway Plan: , Post-Induction  Informed Consent: Informed consent signed with the Patient and all parties understand the risks and agree with anesthesia plan.  All questions answered.   ASA Score: 2    Ready For Surgery From Anesthesia Perspective.     .    Chemistry        Component Value Date/Time     12/03/2024 0953    K 3.8 12/03/2024 0953     12/03/2024 0953    CO2 21 (L) 12/03/2024 0953    BUN 14 12/03/2024 0953    CREATININE 0.7 12/03/2024 0953    GLU 79 12/03/2024 0953        Component Value Date/Time    CALCIUM 9.6 12/03/2024 0953    ALKPHOS 78 12/03/2024 0953    AST 25 12/03/2024 0953    ALT 27 12/03/2024 0953    BILITOT 0.8 12/03/2024 0953        Lab Results   Component Value Date    WBC 6.22 12/03/2024    HGB 12.8 12/03/2024    HCT 41.2 12/03/2024    MCV 77 (L) 12/03/2024     12/03/2024

## 2024-12-19 NOTE — ANESTHESIA PROCEDURE NOTES
Intubation    Date/Time: 12/19/2024 9:39 AM    Performed by: Abiodun Dupont CRNA  Authorized by: Reilly Blake MD    Intubation:     Induction:  Intravenous    Intubated:  Postinduction    Mask Ventilation:  N/a    Attempts:  1    Attempted By:  CRNA    Difficult Airway Encountered?: No      Complications:  None    Airway Device:  Supraglottic airway/LMA (igel)    Airway Device Size:  4.0    Secured at:  The lips    Placement Verified By:  Capnometry    Complicating Factors:  None    Findings Post-Intubation:  BS equal bilateral and atraumatic/condition of teeth unchanged

## 2024-12-19 NOTE — TRANSFER OF CARE
"Anesthesia Transfer of Care Note    Patient: Arlyn Vaughan    Procedure(s) Performed: Procedure(s) (LRB):  CYSTOSCOPY, WITH PERIURETHRAL BULKING AGENT INJECTION (N/A)    Patient location: PACU    Anesthesia Type: general    Transport from OR: Transported from OR on room air with adequate spontaneous ventilation    Post pain: adequate analgesia    Post assessment: no apparent anesthetic complications and tolerated procedure well    Post vital signs: stable    Level of consciousness: awake    Nausea/Vomiting: no nausea/vomiting    Complications: none    Transfer of care protocol was followed    Last vitals: Visit Vitals  /63   Pulse 61   Temp 36.6 °C (97.9 °F) (Temporal)   Resp 16   Ht 5' 6" (1.676 m)   Wt 73.6 kg (162 lb 4.1 oz)   SpO2 99%   Breastfeeding No   BMI 26.19 kg/m²     "

## 2024-12-20 ENCOUNTER — TELEPHONE (OUTPATIENT)
Dept: UROLOGY | Facility: CLINIC | Age: 49
End: 2024-12-20
Payer: COMMERCIAL

## 2024-12-20 NOTE — ANESTHESIA POSTPROCEDURE EVALUATION
Anesthesia Post Evaluation    Patient: Arlyn Vaughan    Procedure(s) Performed: Procedure(s) (LRB):  CYSTOSCOPY, WITH PERIURETHRAL BULKING AGENT INJECTION (N/A)    Final Anesthesia Type: general      Patient location during evaluation: PACU  Patient participation: Yes- Able to Participate  Level of consciousness: awake and alert  Post-procedure vital signs: reviewed and stable  Pain management: adequate  Airway patency: patent  ERICA mitigation strategies: Extubation while patient is awake  PONV status at discharge: No PONV  Anesthetic complications: no      Cardiovascular status: hemodynamically stable  Respiratory status: spontaneous ventilation  Hydration status: euvolemic  Follow-up not needed.              Vitals Value Taken Time   /60 12/19/24 1045   Temp 36.1 °C (97 °F) 12/19/24 1013   Pulse 65 12/19/24 1045   Resp 18 12/19/24 1045   SpO2 94 % 12/19/24 1045         Event Time   Out of Recovery 10:44:09         Pain/Romero Score: Pain Rating Prior to Med Admin: 6 (12/19/2024 10:40 AM)  Romero Score: 10 (12/19/2024 11:15 AM)

## 2024-12-20 NOTE — TELEPHONE ENCOUNTER
.Outgoing call placed to patient, patient verified name and date of birth, patient notified of below, verbalized understanding, appt scheduled per patient request.       ----- Message from Keira Riggisn MD sent at 12/19/2024 10:11 AM CST -----  Please schedule pt for a 3-4 week post-op with UA and PVR on arrival

## 2025-01-13 ENCOUNTER — TELEPHONE (OUTPATIENT)
Dept: UROLOGY | Facility: CLINIC | Age: 50
End: 2025-01-13
Payer: COMMERCIAL

## 2025-01-13 NOTE — TELEPHONE ENCOUNTER
Outgoing call placed to patient, patient verified name and date of birth, patient notified of need to reschedule today's appointment related to provider out sick , she verbalized understanding and appointment rescheduled per patient request, no further assistance needed.

## 2025-01-29 ENCOUNTER — OFFICE VISIT (OUTPATIENT)
Dept: UROLOGY | Facility: CLINIC | Age: 50
End: 2025-01-29
Payer: COMMERCIAL

## 2025-01-29 VITALS
HEIGHT: 66 IN | WEIGHT: 160.69 LBS | RESPIRATION RATE: 18 BRPM | SYSTOLIC BLOOD PRESSURE: 110 MMHG | DIASTOLIC BLOOD PRESSURE: 71 MMHG | HEART RATE: 66 BPM | BODY MASS INDEX: 25.83 KG/M2

## 2025-01-29 DIAGNOSIS — N39.3 SUI (STRESS URINARY INCONTINENCE, FEMALE): ICD-10-CM

## 2025-01-29 DIAGNOSIS — R31.29 MICROHEMATURIA: ICD-10-CM

## 2025-01-29 DIAGNOSIS — N39.3 STRESS INCONTINENCE IN FEMALE: Primary | ICD-10-CM

## 2025-01-29 LAB
BILIRUB UR QL STRIP: NEGATIVE
GLUCOSE UR QL STRIP: NEGATIVE
KETONES UR QL STRIP: NEGATIVE
LEUKOCYTE ESTERASE UR QL STRIP: NEGATIVE
PH, POC UA: 6
POC BLOOD, URINE: POSITIVE
POC NITRATES, URINE: NEGATIVE
POC RESIDUAL URINE VOLUME: 0 ML (ref 0–100)
PROT UR QL STRIP: NEGATIVE
SP GR UR STRIP: 1.02 (ref 1–1.03)
UROBILINOGEN UR STRIP-ACNC: 0.2 (ref 0.1–1.1)

## 2025-01-29 PROCEDURE — 1159F MED LIST DOCD IN RCRD: CPT | Mod: CPTII,S$GLB,, | Performed by: UROLOGY

## 2025-01-29 PROCEDURE — 81001 URINALYSIS AUTO W/SCOPE: CPT | Performed by: UROLOGY

## 2025-01-29 PROCEDURE — 51798 US URINE CAPACITY MEASURE: CPT | Mod: S$GLB,,, | Performed by: UROLOGY

## 2025-01-29 PROCEDURE — 99999 PR PBB SHADOW E&M-EST. PATIENT-LVL IV: CPT | Mod: PBBFAC,,, | Performed by: UROLOGY

## 2025-01-29 PROCEDURE — 99213 OFFICE O/P EST LOW 20 MIN: CPT | Mod: S$GLB,,, | Performed by: UROLOGY

## 2025-01-29 PROCEDURE — 3008F BODY MASS INDEX DOCD: CPT | Mod: CPTII,S$GLB,, | Performed by: UROLOGY

## 2025-01-29 PROCEDURE — 3074F SYST BP LT 130 MM HG: CPT | Mod: CPTII,S$GLB,, | Performed by: UROLOGY

## 2025-01-29 PROCEDURE — 1160F RVW MEDS BY RX/DR IN RCRD: CPT | Mod: CPTII,S$GLB,, | Performed by: UROLOGY

## 2025-01-29 PROCEDURE — 3078F DIAST BP <80 MM HG: CPT | Mod: CPTII,S$GLB,, | Performed by: UROLOGY

## 2025-01-29 PROCEDURE — 81003 URINALYSIS AUTO W/O SCOPE: CPT | Mod: QW,S$GLB,, | Performed by: UROLOGY

## 2025-01-29 NOTE — PROGRESS NOTES
Chief Complaint   Patient presents with    Post-op Evaluation       History of Present Illness:   Arlyn Vaughan is a 49 y.o. female here for evaluation of Post-op Evaluation    12/19/24: Bulkamid touch up last month. States that it is helping. Not perfect. Some Urgency, but not daily. No dysuria or gross hematuria.   10/14/24-Still having occasional JOSE with sneezing. Nothing with squatting or other exercise, but she hasn't been as active as usual at the gym. She hasn't noticed it with normal activity. Having some back issues. Occasional urgency with slight UUI, but no complete incontinence. Last week, she was having some vaginal itching. No discharge.   3/27/24-S/P Bulkamid a few weeks ago. Pt happy with results. No longer wearing liners. Minimal JOSE. No post-op pain or any significant hematuria.   2/19/24-Pt attended pelvic floor PT and is improved, but still having JOSE at times. Wears pads only if she knows she is going to be doing activity. A little UUI. No dysuria/gross hematuria.  11/20/23-49yo female, here for evaluation of urinary incontinence. Pt reports that for the past couple of years, she ahs been having JOSE with sneeze, laugh. Progressively getting worse. Wears a liner, especially when she goes to the gym. She can even leak with jumping, lifting. She reports incontinence even with intercourse. In October, she had her first diagnosed UTI. Symptoms were painful and went on for a week, so she went to urgent care.   She saw Dr. Hunt, who prescribed vesicare, which didn't help with the incontinence, but caused an intermittent urinary stream. She has since stopped it.   She does report some urgency, which may occur as she gets close to the restroom. Sometimes she has UUI on the way.   She struggles with constipation. She was recently diagnosed with IBS. Taking probiotics and drinking Plexus.       Review of Systems   Constitutional:  Positive for fatigue.   Respiratory:  Negative for shortness of  breath.    Cardiovascular:  Negative for chest pain.   Gastrointestinal:  Negative for abdominal pain.   Musculoskeletal:  Positive for arthralgias and back pain.   All other systems reviewed and are negative.        Past Medical History:   Diagnosis Date    Anemia     Arthritis     Breast cyst     Fever blister     Graves disease     Hypothyroidism     IBS (irritable bowel syndrome)     Lichen sclerosus        Past Surgical History:   Procedure Laterality Date    COLONOSCOPY N/A 02/02/2022    Procedure: COLONOSCOPY;  Surgeon: Fransico Miles III, MD;  Location: [facility] ENDO;  Service: Endoscopy;  Laterality: N/A;    CYSTOSCOPY WITH INJECTION OF PERIURETHRAL BULKING AGENT N/A 3/11/2024    Procedure: CYSTOSCOPY, WITH PERIURETHRAL BULKING AGENT INJECTION;  Surgeon: Keira Riggins MD;  Location: [facility] OR;  Service: Urology;  Laterality: N/A;    CYSTOSCOPY WITH INJECTION OF PERIURETHRAL BULKING AGENT N/A 12/19/2024    Procedure: CYSTOSCOPY, WITH PERIURETHRAL BULKING AGENT INJECTION;  Surgeon: Keira Riggins MD;  Location: [facility] OR;  Service: Urology;  Laterality: N/A;    SALPINGOOPHORECTOMY Left 2008    SHOULDER SURGERY Right        Family History   Problem Relation Name Age of Onset    Diabetes type II Father Ralph     Migraines Father Ralph     Heart disease Mother Livia     Gallbladder disease Mother Livia     Fibromyalgia Mother Livia     Arrhythmia Mother Livia     Breast cancer Mother Livia     Osteoarthritis Mother Livia     Rashes / Skin problems Mother Livia     Thyroid disease Mother Livia     Gallbladder disease Sister      Rashes / Skin problems Maternal Grandfather Anthony Carrizales     Diabetes Paternal Grandmother Shyann        Social History     Tobacco Use    Smoking status: Never     Passive exposure: Never    Smokeless tobacco: Never   Substance Use Topics    Alcohol use: Never    Drug use: Never       Current Outpatient Medications   Medication Sig Dispense Refill    biotin  10,000 mcg Cap Take by mouth.      busPIRone (BUSPAR) 5 MG Tab Take one tablet once a day      calcium carbonate/vitamin D3 (VITAMIN D-3 ORAL)       cetirizine (ZYRTEC) 10 MG tablet Take 10 mg by mouth once daily.      clobetasoL (TEMOVATE) 0.05 % cream APPLY  CREAM TOPICALLY TO AFFECTED AREA TWICE DAILY FOR 14 DAYS 30 g 0    estradioL (IMVEXXY MAINTENANCE PACK) 4 mcg Inst Place 4 mcg vaginally twice a week. 8 each 11    estradioL (VIVELLE-DOT) 0.075 mg/24 hr Place 1 patch onto the skin twice a week. 8 patch 11    ferrous sulfate (FEOSOL) 325 mg (65 mg iron) Tab tablet Take 325 mg by mouth.      hydrocortisone 2.5 % cream Apply topically.      levonorgestreL (MIRENA) 20 mcg/24 hours (7 yrs) 52 mg IUD 1 each by Intrauterine route once.      levothyroxine (SYNTHROID) 125 MCG tablet Take 125 mcg by mouth every morning.      meloxicam (MOBIC) 7.5 MG tablet Take 7.5 mg by mouth every morning.      minoxidiL (LONITEN) 2.5 MG tablet       montelukast (SINGULAIR) 10 mg tablet Take 10 mg by mouth every evening.      multivitamin (THERAGRAN) per tablet Take 1 tablet by mouth once daily.      NP THYROID 15 mg Tab       phentermine (ADIPEX-P) 37.5 mg tablet Take 37.5 mg by mouth before breakfast.      spironolactone (ALDACTONE) 100 MG tablet Take 1 tablet by mouth once daily.      VITAMIN B COMPLEX ORAL Take 1 tablet by mouth once daily.      vitamin D (VITAMIN D3) 1000 units Tab Take 1 tablet by mouth every morning.      ZINC ORAL Take 100 mg by mouth.       No current facility-administered medications for this visit.       Review of patient's allergies indicates:  No Known Allergies    Physical Exam  Vitals:    01/29/25 1043   BP: 110/71   Pulse: 66   Resp: 18     General: Well-developed, well-nourished, in no acute distress  HEENT: Normocephalic, atraumatic, extraocular movements intact  Neck: Supple, no supraclavicular or cervical lymphadenopathy, trachea midline  Respirations: even and unlabored  : 11/20/23-Normal  external female genitalia without lesions. Orthotopic urethral meatus. Fairly healthy vaginal mucosa. Grade 2 Cysotcele, + urethral hypermobility  Extremities: moves all equally, no clubbing, cyanosis or edema  Skin: Warm and dry. No lesions  Psych: normal affect  Neuro: Alert and oriented x 3. Cranial nerves II-XII intact    PVR: 0cc    UA: trace blood, otherwise negative    Assessment:  1. Stress incontinence in female  POCT Urinalysis, Dipstick, Automated, W/O Scope    POCT Bladder Scan      2. JOSE (stress urinary incontinence, female)  POCT Urinalysis, Dipstick, Automated, W/O Scope    POCT Bladder Scan      3. Microhematuria  Urinalysis Microscopic            Plan:   Stress incontinence in female  -     POCT Urinalysis, Dipstick, Automated, W/O Scope  -     POCT Bladder Scan    JOSE (stress urinary incontinence, female)  -     POCT Urinalysis, Dipstick, Automated, W/O Scope  -     POCT Bladder Scan    Microhematuria  -     Urinalysis Microscopic    Worse lichen sclerosus lately may be contribuiting to msh. Will get microscopy and may alhaji repeat urine.   Follow up in about 6 months (around 7/29/2025).

## 2025-01-29 NOTE — Clinical Note
Please notify pt that her urine had 3 red blood cells, which is just over the threshold of normal at her last visit. She mentioned some irritation at the time, so I want her to repeat a urine micro. Please schedule.

## 2025-01-30 LAB
BACTERIA #/AREA URNS AUTO: ABNORMAL /HPF
MICROSCOPIC COMMENT: ABNORMAL
RBC #/AREA URNS AUTO: 3 /HPF (ref 0–4)
SQUAMOUS #/AREA URNS AUTO: 8 /HPF
WBC #/AREA URNS AUTO: 6 /HPF (ref 0–5)

## 2025-02-27 ENCOUNTER — TELEPHONE (OUTPATIENT)
Dept: UROLOGY | Facility: CLINIC | Age: 50
End: 2025-02-27
Payer: COMMERCIAL

## 2025-02-27 DIAGNOSIS — R31.29 MICROHEMATURIA: Primary | ICD-10-CM

## 2025-02-27 NOTE — TELEPHONE ENCOUNTER
.Outgoing call placed to patient, patient verified name and date of birth, patient notified of below per Dr. Riggins and appointment scheduled.     ----- Message from Keira Riggins MD sent at 2/27/2025  1:58 PM CST -----  Please notify pt that her urine had 3 red blood cells, which is just over the threshold of normal at her last visit. She mentioned some irritation at the time, so I want her to repeat a urine micro. Please schedule.

## 2025-02-28 ENCOUNTER — LAB VISIT (OUTPATIENT)
Dept: LAB | Facility: HOSPITAL | Age: 50
End: 2025-02-28
Attending: UROLOGY
Payer: COMMERCIAL

## 2025-02-28 DIAGNOSIS — R31.29 MICROHEMATURIA: ICD-10-CM

## 2025-02-28 LAB
BACTERIA #/AREA URNS HPF: ABNORMAL /HPF
MICROSCOPIC COMMENT: ABNORMAL
RBC #/AREA URNS HPF: 2 /HPF (ref 0–4)
WBC #/AREA URNS HPF: 4 /HPF (ref 0–5)

## 2025-02-28 PROCEDURE — 81000 URINALYSIS NONAUTO W/SCOPE: CPT | Performed by: UROLOGY

## 2025-03-03 ENCOUNTER — RESULTS FOLLOW-UP (OUTPATIENT)
Dept: UROLOGY | Facility: CLINIC | Age: 50
End: 2025-03-03

## 2025-08-04 ENCOUNTER — OFFICE VISIT (OUTPATIENT)
Dept: UROLOGY | Facility: CLINIC | Age: 50
End: 2025-08-04
Payer: COMMERCIAL

## 2025-08-04 VITALS
BODY MASS INDEX: 25.51 KG/M2 | SYSTOLIC BLOOD PRESSURE: 111 MMHG | WEIGHT: 158.75 LBS | HEART RATE: 70 BPM | DIASTOLIC BLOOD PRESSURE: 76 MMHG | HEIGHT: 66 IN

## 2025-08-04 DIAGNOSIS — N81.10 BADEN-WALKER GRADE 2 CYSTOCELE: ICD-10-CM

## 2025-08-04 DIAGNOSIS — N32.81 OAB (OVERACTIVE BLADDER): ICD-10-CM

## 2025-08-04 DIAGNOSIS — N39.3 STRESS INCONTINENCE IN FEMALE: Primary | ICD-10-CM

## 2025-08-04 PROCEDURE — 1159F MED LIST DOCD IN RCRD: CPT | Mod: CPTII,S$GLB,, | Performed by: UROLOGY

## 2025-08-04 PROCEDURE — 3008F BODY MASS INDEX DOCD: CPT | Mod: CPTII,S$GLB,, | Performed by: UROLOGY

## 2025-08-04 PROCEDURE — 3078F DIAST BP <80 MM HG: CPT | Mod: CPTII,S$GLB,, | Performed by: UROLOGY

## 2025-08-04 PROCEDURE — 99999 PR PBB SHADOW E&M-EST. PATIENT-LVL IV: CPT | Mod: PBBFAC,,, | Performed by: UROLOGY

## 2025-08-04 PROCEDURE — 3074F SYST BP LT 130 MM HG: CPT | Mod: CPTII,S$GLB,, | Performed by: UROLOGY

## 2025-08-04 PROCEDURE — 99214 OFFICE O/P EST MOD 30 MIN: CPT | Mod: S$GLB,,, | Performed by: UROLOGY

## 2025-08-04 PROCEDURE — 3044F HG A1C LEVEL LT 7.0%: CPT | Mod: CPTII,S$GLB,, | Performed by: UROLOGY

## 2025-08-04 PROCEDURE — 1160F RVW MEDS BY RX/DR IN RCRD: CPT | Mod: CPTII,S$GLB,, | Performed by: UROLOGY

## 2025-08-04 RX ORDER — MIRABEGRON 50 MG/1
50 TABLET, FILM COATED, EXTENDED RELEASE ORAL DAILY
Qty: 30 TABLET | Refills: 11 | Status: SHIPPED | OUTPATIENT
Start: 2025-08-04 | End: 2026-08-04

## (undated) DEVICE — DRAPE T CYSTOSCOPY STERILE

## (undated) DEVICE — SPONGE COTTON TRAY 4X4IN

## (undated) DEVICE — SET IRR URLGY 2LINE UNIV SPIKE

## (undated) DEVICE — GLOVE SURGICAL LATEX SZ 6

## (undated) DEVICE — SKIN MARKER DEVON 160

## (undated) DEVICE — GOWN POLY REINF BRTH SLV XL

## (undated) DEVICE — UROVIEW 2600/2800

## (undated) DEVICE — GLOVE SURGICAL LATEX SZ 6.5

## (undated) DEVICE — COVER TABLE HVY DTY 60X90IN

## (undated) DEVICE — GLOVE SURG BIOGEL LATEX SZ 7.5

## (undated) DEVICE — TOWEL OR DISP STRL BLUE 4/PK

## (undated) DEVICE — SOL IRR NACL .9% 3000ML

## (undated) DEVICE — CONTAINER SPECIMEN STRL 4OZ

## (undated) DEVICE — SOL NORMAL USPCA 0.9%

## (undated) DEVICE — TRAY SKIN SCRUB WET PREMIUM

## (undated) DEVICE — GOWN SMARTGOWN LVL4 X-LONG XL

## (undated) DEVICE — BOWL STERILE LARGE 32OZ

## (undated) DEVICE — CATH URETHRAL RED 16FR

## (undated) DEVICE — KIT TURNOVER

## (undated) DEVICE — SYR ONLY LUER LOCK 20CC

## (undated) DEVICE — LEGGING CLEAR POLY 2/PACK